# Patient Record
Sex: FEMALE | Race: WHITE | URBAN - METROPOLITAN AREA
[De-identification: names, ages, dates, MRNs, and addresses within clinical notes are randomized per-mention and may not be internally consistent; named-entity substitution may affect disease eponyms.]

---

## 2019-04-11 ENCOUNTER — APPOINTMENT (RX ONLY)
Dept: URBAN - METROPOLITAN AREA CLINIC 27 | Facility: CLINIC | Age: 74
Setting detail: DERMATOLOGY
End: 2019-04-11

## 2019-04-11 DIAGNOSIS — L81.6 OTHER DISORDERS OF DIMINISHED MELANIN FORMATION: ICD-10-CM

## 2019-04-11 DIAGNOSIS — L57.0 ACTINIC KERATOSIS: ICD-10-CM

## 2019-04-11 DIAGNOSIS — L81.4 OTHER MELANIN HYPERPIGMENTATION: ICD-10-CM

## 2019-04-11 DIAGNOSIS — Z41.9 ENCOUNTER FOR PROCEDURE FOR PURPOSES OTHER THAN REMEDYING HEALTH STATE, UNSPECIFIED: ICD-10-CM

## 2019-04-11 DIAGNOSIS — D22 MELANOCYTIC NEVI: ICD-10-CM

## 2019-04-11 DIAGNOSIS — L30.9 DERMATITIS, UNSPECIFIED: ICD-10-CM

## 2019-04-11 DIAGNOSIS — L82.1 OTHER SEBORRHEIC KERATOSIS: ICD-10-CM

## 2019-04-11 DIAGNOSIS — L71.8 OTHER ROSACEA: ICD-10-CM

## 2019-04-11 DIAGNOSIS — D18.0 HEMANGIOMA: ICD-10-CM

## 2019-04-11 DIAGNOSIS — Z71.89 OTHER SPECIFIED COUNSELING: ICD-10-CM

## 2019-04-11 PROBLEM — D18.01 HEMANGIOMA OF SKIN AND SUBCUTANEOUS TISSUE: Status: ACTIVE | Noted: 2019-04-11

## 2019-04-11 PROBLEM — D22.5 MELANOCYTIC NEVI OF TRUNK: Status: ACTIVE | Noted: 2019-04-11

## 2019-04-11 PROBLEM — D22.39 MELANOCYTIC NEVI OF OTHER PARTS OF FACE: Status: ACTIVE | Noted: 2019-04-11

## 2019-04-11 PROBLEM — D22.61 MELANOCYTIC NEVI OF RIGHT UPPER LIMB, INCLUDING SHOULDER: Status: ACTIVE | Noted: 2019-04-11

## 2019-04-11 PROBLEM — D22.62 MELANOCYTIC NEVI OF LEFT UPPER LIMB, INCLUDING SHOULDER: Status: ACTIVE | Noted: 2019-04-11

## 2019-04-11 PROBLEM — D22.72 MELANOCYTIC NEVI OF LEFT LOWER LIMB, INCLUDING HIP: Status: ACTIVE | Noted: 2019-04-11

## 2019-04-11 PROBLEM — D22.71 MELANOCYTIC NEVI OF RIGHT LOWER LIMB, INCLUDING HIP: Status: ACTIVE | Noted: 2019-04-11

## 2019-04-11 PROCEDURE — ? MEDICAL CONSULTATION: FILLERS

## 2019-04-11 PROCEDURE — ? PRESCRIPTION

## 2019-04-11 PROCEDURE — ? COUNSELING

## 2019-04-11 PROCEDURE — 17003 DESTRUCT PREMALG LES 2-14: CPT

## 2019-04-11 PROCEDURE — ? OTHER

## 2019-04-11 PROCEDURE — 99203 OFFICE O/P NEW LOW 30 MIN: CPT | Mod: 25

## 2019-04-11 PROCEDURE — 17000 DESTRUCT PREMALG LESION: CPT

## 2019-04-11 PROCEDURE — ? LIQUID NITROGEN

## 2019-04-11 RX ORDER — AMMONIUM LACTATE 12 %
LOTION (GRAM) TOPICAL QD
Qty: 1 | Refills: 5 | Status: ERX | COMMUNITY
Start: 2019-04-11

## 2019-04-11 RX ORDER — TRIAMCINOLONE ACETONIDE 1 MG/G
CREAM TOPICAL BID
Qty: 1 | Refills: 0 | Status: ERX | COMMUNITY
Start: 2019-04-11

## 2019-04-11 RX ADMIN — Medication 1: at 00:00

## 2019-04-11 RX ADMIN — TRIAMCINOLONE ACETONIDE 1: 1 CREAM TOPICAL at 00:00

## 2019-04-11 ASSESSMENT — LOCATION DETAILED DESCRIPTION DERM
LOCATION DETAILED: LEFT UPPER CUTANEOUS LIP
LOCATION DETAILED: SUPERIOR LUMBAR SPINE
LOCATION DETAILED: EPIGASTRIC SKIN
LOCATION DETAILED: RIGHT SUPERIOR VERMILION LIP
LOCATION DETAILED: NASAL DORSUM
LOCATION DETAILED: RIGHT MEDIAL BREAST 5-6:00 REGION
LOCATION DETAILED: UPPER STERNUM
LOCATION DETAILED: MIDDLE STERNUM
LOCATION DETAILED: LEFT INFERIOR NASAL CHEEK
LOCATION DETAILED: LEFT INFERIOR MEDIAL MALAR CHEEK
LOCATION DETAILED: RIGHT SUPERIOR MEDIAL UPPER BACK
LOCATION DETAILED: LEFT RIB CAGE
LOCATION DETAILED: RIGHT CENTRAL MALAR CHEEK
LOCATION DETAILED: RIGHT ANTERIOR PROXIMAL THIGH
LOCATION DETAILED: RIGHT PROXIMAL PRETIBIAL REGION
LOCATION DETAILED: LEFT PROXIMAL DORSAL FOREARM
LOCATION DETAILED: LEFT INFERIOR CENTRAL MALAR CHEEK
LOCATION DETAILED: RIGHT DISTAL PRETIBIAL REGION
LOCATION DETAILED: RIGHT MEDIAL UPPER BACK
LOCATION DETAILED: LEFT ANTERIOR DISTAL UPPER ARM
LOCATION DETAILED: LEFT PROXIMAL PRETIBIAL REGION
LOCATION DETAILED: LEFT MEDIAL MALAR CHEEK
LOCATION DETAILED: RIGHT MEDIAL SUPERIOR CHEST
LOCATION DETAILED: LEFT INFERIOR VERMILION LIP
LOCATION DETAILED: RIGHT PROXIMAL DORSAL FOREARM
LOCATION DETAILED: LEFT CENTRAL MALAR CHEEK
LOCATION DETAILED: PERIUMBILICAL SKIN
LOCATION DETAILED: LEFT SUPERIOR MEDIAL BUCCAL CHEEK
LOCATION DETAILED: RIGHT DISTAL POSTERIOR UPPER ARM
LOCATION DETAILED: RIGHT ANTERIOR DISTAL UPPER ARM
LOCATION DETAILED: SUPERIOR THORACIC SPINE
LOCATION DETAILED: LEFT DISTAL PRETIBIAL REGION
LOCATION DETAILED: RIGHT PROXIMAL RADIAL DORSAL FOREARM

## 2019-04-11 ASSESSMENT — LOCATION SIMPLE DESCRIPTION DERM
LOCATION SIMPLE: RIGHT THIGH
LOCATION SIMPLE: RIGHT UPPER ARM
LOCATION SIMPLE: LOWER BACK
LOCATION SIMPLE: LEFT UPPER ARM
LOCATION SIMPLE: LEFT LIP
LOCATION SIMPLE: RIGHT POSTERIOR UPPER ARM
LOCATION SIMPLE: RIGHT PRETIBIAL REGION
LOCATION SIMPLE: RIGHT LIP
LOCATION SIMPLE: ABDOMEN
LOCATION SIMPLE: CHEST
LOCATION SIMPLE: RIGHT BREAST
LOCATION SIMPLE: RIGHT CHEEK
LOCATION SIMPLE: RIGHT FOREARM
LOCATION SIMPLE: LEFT CHEEK
LOCATION SIMPLE: NOSE
LOCATION SIMPLE: LEFT PRETIBIAL REGION
LOCATION SIMPLE: LEFT FOREARM
LOCATION SIMPLE: UPPER BACK
LOCATION SIMPLE: RIGHT UPPER BACK

## 2019-04-11 ASSESSMENT — LOCATION ZONE DERM
LOCATION ZONE: NOSE
LOCATION ZONE: LIP
LOCATION ZONE: ARM
LOCATION ZONE: LEG
LOCATION ZONE: TRUNK
LOCATION ZONE: FACE

## 2019-04-11 NOTE — PROCEDURE: OTHER
Note Text (......Xxx Chief Complaint.): This diagnosis correlates with the
Other (Free Text): Pt to start TAC on chest and back as directed\\nPt to start ammonium lactate daily to chest and back\\n\\nIf lesions do not resolve, consider possibility of actinic damage\\n-pt to return if lesions do not resolve
Detail Level: Zone
Other (Free Text): Pt interested in fillers perioral\\nPt stated having had good results with fillers in the past

## 2019-06-18 ENCOUNTER — APPOINTMENT (RX ONLY)
Dept: URBAN - METROPOLITAN AREA CLINIC 27 | Facility: CLINIC | Age: 74
Setting detail: DERMATOLOGY
End: 2019-06-18

## 2019-06-18 DIAGNOSIS — Z41.9 ENCOUNTER FOR PROCEDURE FOR PURPOSES OTHER THAN REMEDYING HEALTH STATE, UNSPECIFIED: ICD-10-CM

## 2019-06-18 PROCEDURE — ? RESTYLANE REFYNE INJECTION

## 2019-06-18 NOTE — PROCEDURE: RESTYLANE REFYNE INJECTION
Topical Anesthesia?: BLT cream (benzocaine 20%, lidocaine 10%, tetracaine 10%)
Decollete Filler Volume In Cc: 0
Expiration Date (Month Year): 5/31/2020
Use Map Statement For Sites (Optional): No
Map Statement: See Attach Map for Complete Details
Consent: Written consent obtained. Risks include but not limited to bruising, beading, irregular texture, ulceration, infection, allergic reaction, scar formation, incomplete augmentation, temporary nature, procedural pain.
Detail Level: Detailed
Lot #: [id number]
Post-Care Instructions: Patient instructed to apply ice to reduce swelling.
Filler: Restylane Refyne
Additional Area 1 Location: Perioral
Procedural Text: The filler was administered to the treatment areas noted above.
Price (Use Numbers Only, No Special Characters Or $): 700.00
Marionette Lines Filler Volume In Cc: 0.2
Additional Area 1 Volume In Cc: 0.4

## 2019-07-02 ENCOUNTER — APPOINTMENT (RX ONLY)
Dept: URBAN - METROPOLITAN AREA CLINIC 27 | Facility: CLINIC | Age: 74
Setting detail: DERMATOLOGY
End: 2019-07-02

## 2019-07-02 DIAGNOSIS — Z41.9 ENCOUNTER FOR PROCEDURE FOR PURPOSES OTHER THAN REMEDYING HEALTH STATE, UNSPECIFIED: ICD-10-CM

## 2019-07-02 PROCEDURE — ? RESTYLANE REFYNE INJECTION

## 2019-07-02 PROCEDURE — ? OTHER

## 2019-07-02 NOTE — PROCEDURE: OTHER
Other (Free Text): -No charge; pt used the remainder of her filler from her LV on 6/18/19
Note Text (......Xxx Chief Complaint.): This diagnosis correlates with the
Detail Level: Zone

## 2019-07-02 NOTE — PROCEDURE: RESTYLANE REFYNE INJECTION
Use Map Statement For Sites (Optional): No
Expiration Date (Month Year): 5/31/2019
Additional Area 4 Volume In Cc: 0
Topical Anesthesia?: BLT cream (benzocaine 20%, lidocaine 10%, tetracaine 10%)
Anesthesia Volume In Cc: 0.5
Detail Level: Detailed
Procedural Text: The filler was administered to the treatment areas noted above.
Additional Area 1 Location: Perioral
Filler: Restylane Refyne
Map Statement: See Attach Map for Complete Details
Consent: Written consent obtained. Risks include but not limited to bruising, beading, irregular texture, ulceration, infection, allergic reaction, scar formation, incomplete augmentation, temporary nature, procedural pain.
Lot #: [id number]
Post-Care Instructions: Patient instructed to apply ice to reduce swelling.
Additional Area 1 Volume In Cc: 0.4
Additional Anesthesia Volume In Cc: 6
Anesthesia Type: 1% lidocaine with epinephrine

## 2019-07-25 ENCOUNTER — APPOINTMENT (RX ONLY)
Dept: URBAN - METROPOLITAN AREA CLINIC 27 | Facility: CLINIC | Age: 74
Setting detail: DERMATOLOGY
End: 2019-07-25

## 2019-07-25 DIAGNOSIS — Z41.9 ENCOUNTER FOR PROCEDURE FOR PURPOSES OTHER THAN REMEDYING HEALTH STATE, UNSPECIFIED: ICD-10-CM

## 2019-07-25 PROCEDURE — ? RESTYLANE LYFT INJECTION

## 2019-07-25 ASSESSMENT — LOCATION DETAILED DESCRIPTION DERM
LOCATION DETAILED: LEFT SUPERIOR LATERAL MALAR CHEEK
LOCATION DETAILED: RIGHT SUPERIOR LATERAL MALAR CHEEK
LOCATION DETAILED: RIGHT CENTRAL MALAR CHEEK
LOCATION DETAILED: LEFT SUPERIOR CENTRAL MALAR CHEEK
LOCATION DETAILED: LEFT SUPERIOR MEDIAL MALAR CHEEK

## 2019-07-25 ASSESSMENT — LOCATION SIMPLE DESCRIPTION DERM
LOCATION SIMPLE: RIGHT CHEEK
LOCATION SIMPLE: LEFT CHEEK

## 2019-07-25 ASSESSMENT — LOCATION ZONE DERM: LOCATION ZONE: FACE

## 2019-07-25 NOTE — PROCEDURE: RESTYLANE LYFT INJECTION
Procedural Text: The filler was administered to the treatment areas noted above.
Consent: Written consent obtained. Risks include but not limited to bruising, beading, irregular texture, ulceration, infection, allergic reaction, scar formation, incomplete augmentation, temporary nature, procedural pain.
Additional Area 5 Volume In Cc: 0
Filler: Sarah Zacarias
Lot #: [id number]
Post-Care Instructions: Patient instructed to apply ice to reduce swelling.
Include Cannula Information In Note?: No
Price (Use Numbers Only, No Special Characters Or $): 223
Additional Anesthesia Volume In Cc: 6
Cheeks Filler Volume In Cc: 1
Expiration Date (Month Year): 11/30/2021
Topical Anesthesia?: BLT cream (benzocaine 20%, lidocaine 6%, tetracaine 4%)
Anesthesia Volume In Cc: 0.5
Detail Level: Detailed
Map Statement: See Attach Map for Complete Details

## 2020-01-10 ENCOUNTER — APPOINTMENT (RX ONLY)
Dept: URBAN - METROPOLITAN AREA CLINIC 27 | Facility: CLINIC | Age: 75
Setting detail: DERMATOLOGY
End: 2020-01-10

## 2020-01-10 DIAGNOSIS — Z41.9 ENCOUNTER FOR PROCEDURE FOR PURPOSES OTHER THAN REMEDYING HEALTH STATE, UNSPECIFIED: ICD-10-CM

## 2020-01-10 PROCEDURE — ? OTHER

## 2020-01-10 PROCEDURE — ? RESTYLANE REFYNE INJECTION

## 2020-01-10 PROCEDURE — ? RESTYLANE LYFT INJECTION

## 2020-01-10 NOTE — PROCEDURE: OTHER
Detail Level: Zone
Note Text (......Xxx Chief Complaint.): This diagnosis correlates with the
Other (Free Text): - Saved remaining of the 0.4 cc of Defyne for lip touch up\\n- Upcoming wedding in 2 weeks\\n- VS special- purchased 2

## 2020-01-10 NOTE — PROCEDURE: RESTYLANE LYFT INJECTION
Use Map Statement For Sites (Optional): No
Number Of Syringes (Required For Inventory): 1
Additional Area 1 Volume In Cc: 0
Additional Anesthesia Volume In Cc: 6
Reconstitution Date: 1-
Post-Care Instructions: Patient instructed to apply ice to reduce swelling.
Anesthesia Volume In Cc: 0.5
Detail Level: Detailed
Expiration Date (Month Year): 4-
Map Statement: See Attach Map for Complete Details
Filler: Sarah Zacarias
Procedural Text: The filler was administered to the treatment areas noted above.
Consent: Written consent obtained. Risks include but not limited to bruising, beading, irregular texture, ulceration, infection, allergic reaction, scar formation, incomplete augmentation, temporary nature, procedural pain.
Lot #: [id number]
Anesthesia Type: 1% lidocaine with epinephrine

## 2020-01-10 NOTE — PROCEDURE: RESTYLANE REFYNE INJECTION
Brows Filler Volume In Cc: 0
Number Of Syringes (Required For Inventory): 1
Additional Area 1 Volume In Cc: 0.6
Use Map Statement For Sites (Optional): No
Reconstitution Date: 1-
Detail Level: Detailed
Map Statement: See Attach Map for Complete Details
Expiration Date (Month Year): 5-1-2020
Anesthesia Volume In Cc: 0.5
Filler: Restylane Refyne
Consent: Written consent obtained. Risks include but not limited to bruising, beading, irregular texture, ulceration, infection, allergic reaction, scar formation, incomplete augmentation, temporary nature, procedural pain.
Post-Care Instructions: Patient instructed to apply ice to reduce swelling.
Additional Anesthesia Volume In Cc: 6
Procedural Text: The filler was administered to the treatment areas noted above.
Additional Area 1 Location: perioral
Anesthesia Type: 1% lidocaine with epinephrine
Lot #: [id number]

## 2020-01-30 ENCOUNTER — APPOINTMENT (RX ONLY)
Dept: URBAN - METROPOLITAN AREA CLINIC 27 | Facility: CLINIC | Age: 75
Setting detail: DERMATOLOGY
End: 2020-01-30

## 2020-01-30 DIAGNOSIS — Z41.9 ENCOUNTER FOR PROCEDURE FOR PURPOSES OTHER THAN REMEDYING HEALTH STATE, UNSPECIFIED: ICD-10-CM

## 2020-01-30 PROCEDURE — ? FACIAL

## 2020-01-30 ASSESSMENT — LOCATION SIMPLE DESCRIPTION DERM: LOCATION SIMPLE: LEFT CHEEK

## 2020-01-30 ASSESSMENT — LOCATION ZONE DERM: LOCATION ZONE: FACE

## 2020-01-30 ASSESSMENT — LOCATION DETAILED DESCRIPTION DERM: LOCATION DETAILED: LEFT INFERIOR CENTRAL MALAR CHEEK

## 2020-01-30 NOTE — PROCEDURE: FACIAL
Detail Level: Zone
Facial Steaming: steamed
Treatment Type (Optional): Spa Facial
Mask Type (Optional): calming
Comments (Sticky): *comp for being so patient waiting for filler.\\n\\nGentle cleanser- steam/hot towel, papaya enzyme-steam/hot towel, cucumber toner, 12 flower mask + ormedic mask, warm towel removal, ce ferulic, HA Intensifier, triple lipid, fusion SPF 50, ormedic lip.
Exfoliation Type: enzyme

## 2020-02-07 ENCOUNTER — APPOINTMENT (RX ONLY)
Dept: URBAN - METROPOLITAN AREA CLINIC 27 | Facility: CLINIC | Age: 75
Setting detail: DERMATOLOGY
End: 2020-02-07

## 2020-02-07 DIAGNOSIS — Z41.9 ENCOUNTER FOR PROCEDURE FOR PURPOSES OTHER THAN REMEDYING HEALTH STATE, UNSPECIFIED: ICD-10-CM

## 2020-02-07 PROCEDURE — ? COSMETIC FOLLOW-UP

## 2020-02-07 PROCEDURE — ? PRESCRIPTION

## 2020-02-07 PROCEDURE — ? RESTYLANE REFYNE INJECTION

## 2020-02-07 ASSESSMENT — LOCATION ZONE DERM: LOCATION ZONE: FACE

## 2020-02-07 ASSESSMENT — LOCATION SIMPLE DESCRIPTION DERM
LOCATION SIMPLE: LEFT CHEEK
LOCATION SIMPLE: RIGHT CHEEK

## 2020-02-07 ASSESSMENT — LOCATION DETAILED DESCRIPTION DERM
LOCATION DETAILED: LEFT SUPERIOR MEDIAL BUCCAL CHEEK
LOCATION DETAILED: RIGHT SUPERIOR MEDIAL BUCCAL CHEEK
LOCATION DETAILED: LEFT CENTRAL MALAR CHEEK
LOCATION DETAILED: RIGHT CENTRAL MALAR CHEEK

## 2020-02-07 NOTE — PROCEDURE: RESTYLANE REFYNE INJECTION
Lateral Face Filler Volume In Cc: 0
Reconstitution Date: 1-
Additional Anesthesia Volume In Cc: 6
Map Statement: See Attach Map for Complete Details
Anesthesia Type: 1% lidocaine with epinephrine
Lot #: [id number]
Additional Area 1 Location: Lips
Additional Area 1 Volume In Cc: 0.4
Include Cannula Information In Note?: No
Consent: Written consent obtained. Risks include but not limited to bruising, beading, irregular texture, ulceration, infection, allergic reaction, scar formation, incomplete augmentation, temporary nature, procedural pain.
Expiration Date (Month Year): 5-1-2020
Anesthesia Volume In Cc: 0.5
Detail Level: Detailed
Post-Care Instructions: Patient instructed to apply ice to reduce swelling.
Filler: Restylane Refyne
Procedural Text: The filler was administered to the treatment areas noted above.
Number Of Syringes (Required For Inventory): 1

## 2020-02-07 NOTE — PROCEDURE: COSMETIC FOLLOW-UP
Detail Level: Zone
Patient Satisfaction: Very pleased
Side Effects Or Complications: None
Treatment (Optional): Filler Injection
Global Improvement: Very Good

## 2020-09-10 ENCOUNTER — APPOINTMENT (RX ONLY)
Dept: URBAN - METROPOLITAN AREA CLINIC 27 | Facility: CLINIC | Age: 75
Setting detail: DERMATOLOGY
End: 2020-09-10

## 2020-09-10 DIAGNOSIS — D22 MELANOCYTIC NEVI: ICD-10-CM

## 2020-09-10 DIAGNOSIS — D18.0 HEMANGIOMA: ICD-10-CM

## 2020-09-10 DIAGNOSIS — L82.0 INFLAMED SEBORRHEIC KERATOSIS: ICD-10-CM

## 2020-09-10 DIAGNOSIS — L82.1 OTHER SEBORRHEIC KERATOSIS: ICD-10-CM

## 2020-09-10 PROBLEM — D22.72 MELANOCYTIC NEVI OF LEFT LOWER LIMB, INCLUDING HIP: Status: ACTIVE | Noted: 2020-09-10

## 2020-09-10 PROBLEM — D22.5 MELANOCYTIC NEVI OF TRUNK: Status: ACTIVE | Noted: 2020-09-10

## 2020-09-10 PROBLEM — D22.39 MELANOCYTIC NEVI OF OTHER PARTS OF FACE: Status: ACTIVE | Noted: 2020-09-10

## 2020-09-10 PROBLEM — D18.01 HEMANGIOMA OF SKIN AND SUBCUTANEOUS TISSUE: Status: ACTIVE | Noted: 2020-09-10

## 2020-09-10 PROBLEM — D22.61 MELANOCYTIC NEVI OF RIGHT UPPER LIMB, INCLUDING SHOULDER: Status: ACTIVE | Noted: 2020-09-10

## 2020-09-10 PROBLEM — D22.71 MELANOCYTIC NEVI OF RIGHT LOWER LIMB, INCLUDING HIP: Status: ACTIVE | Noted: 2020-09-10

## 2020-09-10 PROBLEM — D22.62 MELANOCYTIC NEVI OF LEFT UPPER LIMB, INCLUDING SHOULDER: Status: ACTIVE | Noted: 2020-09-10

## 2020-09-10 PROCEDURE — 99214 OFFICE O/P EST MOD 30 MIN: CPT | Mod: 25

## 2020-09-10 PROCEDURE — 17110 DESTRUCTION B9 LES UP TO 14: CPT

## 2020-09-10 PROCEDURE — ? COUNSELING

## 2020-09-10 PROCEDURE — ? LIQUID NITROGEN

## 2020-09-10 ASSESSMENT — LOCATION ZONE DERM
LOCATION ZONE: LEG
LOCATION ZONE: TRUNK
LOCATION ZONE: ARM
LOCATION ZONE: SCALP
LOCATION ZONE: FACE

## 2020-09-10 ASSESSMENT — LOCATION DETAILED DESCRIPTION DERM
LOCATION DETAILED: RIGHT DISTAL PRETIBIAL REGION
LOCATION DETAILED: RIGHT CENTRAL MALAR CHEEK
LOCATION DETAILED: SUPERIOR THORACIC SPINE
LOCATION DETAILED: UPPER STERNUM
LOCATION DETAILED: RIGHT MEDIAL UPPER BACK
LOCATION DETAILED: LEFT DISTAL PRETIBIAL REGION
LOCATION DETAILED: RIGHT PROXIMAL RADIAL DORSAL FOREARM
LOCATION DETAILED: SUPERIOR LUMBAR SPINE
LOCATION DETAILED: RIGHT PROXIMAL PRETIBIAL REGION
LOCATION DETAILED: RIGHT ANTERIOR DISTAL UPPER ARM
LOCATION DETAILED: RIGHT INFERIOR MEDIAL MALAR CHEEK
LOCATION DETAILED: LEFT PROXIMAL PRETIBIAL REGION
LOCATION DETAILED: RIGHT VENTRAL PROXIMAL FOREARM
LOCATION DETAILED: PERIUMBILICAL SKIN
LOCATION DETAILED: EPIGASTRIC SKIN
LOCATION DETAILED: LEFT VENTRAL LATERAL PROXIMAL FOREARM
LOCATION DETAILED: LEFT INFERIOR CENTRAL MALAR CHEEK
LOCATION DETAILED: LEFT CENTRAL MALAR CHEEK
LOCATION DETAILED: LEFT ANTERIOR DISTAL UPPER ARM
LOCATION DETAILED: LEFT PROXIMAL DORSAL FOREARM
LOCATION DETAILED: LEFT INFERIOR CENTRAL BUCCAL CHEEK
LOCATION DETAILED: LEFT MEDIAL FRONTAL SCALP

## 2020-09-10 ASSESSMENT — LOCATION SIMPLE DESCRIPTION DERM
LOCATION SIMPLE: RIGHT CHEEK
LOCATION SIMPLE: ABDOMEN
LOCATION SIMPLE: RIGHT UPPER ARM
LOCATION SIMPLE: LEFT UPPER ARM
LOCATION SIMPLE: RIGHT FOREARM
LOCATION SIMPLE: LEFT PRETIBIAL REGION
LOCATION SIMPLE: LEFT CHEEK
LOCATION SIMPLE: LEFT SCALP
LOCATION SIMPLE: UPPER BACK
LOCATION SIMPLE: CHEST
LOCATION SIMPLE: LOWER BACK
LOCATION SIMPLE: RIGHT UPPER BACK
LOCATION SIMPLE: RIGHT PRETIBIAL REGION
LOCATION SIMPLE: LEFT FOREARM

## 2020-09-10 NOTE — PROCEDURE: LIQUID NITROGEN
Medical Necessity Clause: This procedure was medically necessary because the lesions that were treated were:
Number Of Freeze-Thaw Cycles: 2 freeze-thaw cycles
Post-Care Instructions: I reviewed with the patient in detail post-care instructions. Patient is to wear sunprotection, and avoid picking at any of the treated lesions. Pt may apply Vaseline to crusted or scabbing areas.
Render Post-Care Instructions In Note?: no
Consent: The patient's consent was obtained including but not limited to risks of crusting, scabbing, blistering, scarring, darker or lighter pigmentary change, recurrence, incomplete removal and infection.
Medical Necessity Information: It is in your best interest to select a reason for this procedure from the list below. All of these items fulfill various CMS LCD requirements except the new and changing color options.
Detail Level: Simple

## 2020-09-25 ENCOUNTER — APPOINTMENT (RX ONLY)
Dept: URBAN - METROPOLITAN AREA TELEMEDICINE 2 | Facility: TELEMEDICINE | Age: 75
Setting detail: DERMATOLOGY
End: 2020-09-25

## 2020-09-25 DIAGNOSIS — Z41.9 ENCOUNTER FOR PROCEDURE FOR PURPOSES OTHER THAN REMEDYING HEALTH STATE, UNSPECIFIED: ICD-10-CM

## 2020-09-25 PROCEDURE — ? COSMETIC CONSULTATION: FILLERS

## 2020-09-25 PROCEDURE — ? OTHER

## 2020-09-25 PROCEDURE — ? FILLERS

## 2020-09-25 NOTE — PROCEDURE: FILLERS
Filler: Juvederm Volbella XC
Additional Area 3 Volume In Cc: 0
Filler: Sarah Zacarias
Price (Use Numbers Only, No Special Characters Or $): 8322
Nasolabial Folds Filler Volume In Cc: 0.5
Use Map Statement For Sites (Optional): No
Vermilion Lips Filler Volume In Cc: 0.1
Map Statment: See Attach Map for Complete Details
Cheeks Filler Volume In Cc: 1
Lot #: [id number]
Lot #: [id number]
Marionette Lines Filler Volume In Cc: 0.4
Consent: Written consent obtained. Risks include but not limited to bruising, beading, irregular texture, ulceration, infection, allergic reaction, scar formation, incomplete augmentation, temporary nature, procedural pain.
Lot #: [id number]
Reconstitution Date: 9/25/20
Post-Care Instructions: Patient instructed to apply ice to reduce swelling.
Anesthesia Type: 1% lidocaine with epinephrine
Expiration Date (Month Year): 6/28/2021
Expiration Date (Month Year): 11/30/2022
Expiration Date (Month Year): 8/27/2021
Additional Anesthesia Volume In Cc: 6
Topical Anesthesia?: BLT gel (benzocaine 20%, lidocaine 10%, tetracaine 6%)
Filler: Juvederm Ultra Plus XC
Detail Level: Detailed

## 2021-03-04 ENCOUNTER — APPOINTMENT (RX ONLY)
Dept: URBAN - METROPOLITAN AREA TELEMEDICINE 2 | Facility: TELEMEDICINE | Age: 76
Setting detail: DERMATOLOGY
End: 2021-03-04

## 2021-03-04 DIAGNOSIS — L57.0 ACTINIC KERATOSIS: ICD-10-CM

## 2021-03-04 DIAGNOSIS — Z41.9 ENCOUNTER FOR PROCEDURE FOR PURPOSES OTHER THAN REMEDYING HEALTH STATE, UNSPECIFIED: ICD-10-CM

## 2021-03-04 PROCEDURE — 17000 DESTRUCT PREMALG LESION: CPT

## 2021-03-04 PROCEDURE — ? PATIENT SPECIFIC COUNSELING

## 2021-03-04 PROCEDURE — ? LIQUID NITROGEN

## 2021-03-04 PROCEDURE — ? FILLERS

## 2021-03-04 PROCEDURE — ? COSMETIC CONSULTATION: FILLERS

## 2021-03-04 PROCEDURE — ? OTHER (COSMETIC)

## 2021-03-04 PROCEDURE — ? COUNSELING

## 2021-03-04 ASSESSMENT — LOCATION ZONE DERM: LOCATION ZONE: FACE

## 2021-03-04 ASSESSMENT — LOCATION SIMPLE DESCRIPTION DERM: LOCATION SIMPLE: LEFT FOREHEAD

## 2021-03-04 ASSESSMENT — LOCATION DETAILED DESCRIPTION DERM: LOCATION DETAILED: LEFT FOREHEAD

## 2021-03-04 NOTE — PROCEDURE: LIQUID NITROGEN
Number Of Freeze-Thaw Cycles: 3 freeze-thaw cycles
Duration Of Freeze Thaw-Cycle (Seconds): 5
Render Note In Bullet Format When Appropriate: No
Detail Level: Zone
Render Post-Care Instructions In Note?: yes
Post-Care Instructions: I reviewed with the patient in detail post-care instructions. Patient is to wear sunprotection, and avoid picking at any of the treated lesions. Pt may apply Vaseline to crusted or scabbing areas.
Consent: The patient's consent was obtained including but not limited to risks of crusting, scabbing, blistering, scarring, darker or lighter pigmentary change, recurrence, incomplete removal and infection.

## 2021-03-04 NOTE — PROCEDURE: FILLERS
Additional Area 3 Volume In Cc: 0
Detail Level: Detailed
Cheeks Filler Volume In Cc: 0.6
Filler: Juvederm Ultra Plus XC
Jawline Filler Volume In Cc: 0.4
Filler: Juvederm Volbella XC
Price (Use Numbers Only, No Special Characters Or $): 6015
Use Map Statement For Sites (Optional): No
Lot #: [id number]
Reconstitution Date: 03/04/2021
Map Statment: See Attach Map for Complete Details
Nasolabial Folds Filler Volume In Cc: 0.7
Expiration Date (Month Year): 07/31/2023
Consent: Written consent obtained. Risks include but not limited to bruising, beading, irregular texture, ulceration, infection, allergic reaction, scar formation, incomplete augmentation, temporary nature, procedural pain.
Lot #: [id number]
Post-Care Instructions: Patient instructed to apply ice to reduce swelling.
Marionette Lines Filler Volume In Cc: 0.3
Vermilion Lips Filler Volume In Cc: 0.5
Lot #: [id number]
Expiration Date (Month Year): 08/02/2021
Anesthesia Type: 1% lidocaine with epinephrine
Expiration Date (Month Year): 08/07/2022
Additional Anesthesia Volume In Cc: 6
Topical Anesthesia?: BLT cream (benzocaine 20%, lidocaine 6%, tetracaine 4%)
Filler: Sarah Zacarias

## 2021-08-11 ENCOUNTER — APPOINTMENT (RX ONLY)
Dept: URBAN - METROPOLITAN AREA TELEMEDICINE 2 | Facility: TELEMEDICINE | Age: 76
Setting detail: DERMATOLOGY
End: 2021-08-11

## 2021-08-11 DIAGNOSIS — D22 MELANOCYTIC NEVI: ICD-10-CM

## 2021-08-11 DIAGNOSIS — L81.4 OTHER MELANIN HYPERPIGMENTATION: ICD-10-CM

## 2021-08-11 DIAGNOSIS — Z71.89 OTHER SPECIFIED COUNSELING: ICD-10-CM

## 2021-08-11 DIAGNOSIS — L30.9 DERMATITIS, UNSPECIFIED: ICD-10-CM

## 2021-08-11 DIAGNOSIS — L82.1 OTHER SEBORRHEIC KERATOSIS: ICD-10-CM

## 2021-08-11 DIAGNOSIS — L57.0 ACTINIC KERATOSIS: ICD-10-CM

## 2021-08-11 DIAGNOSIS — D18.0 HEMANGIOMA: ICD-10-CM

## 2021-08-11 PROBLEM — D22.71 MELANOCYTIC NEVI OF RIGHT LOWER LIMB, INCLUDING HIP: Status: ACTIVE | Noted: 2021-08-11

## 2021-08-11 PROBLEM — D22.5 MELANOCYTIC NEVI OF TRUNK: Status: ACTIVE | Noted: 2021-08-11

## 2021-08-11 PROBLEM — D22.39 MELANOCYTIC NEVI OF OTHER PARTS OF FACE: Status: ACTIVE | Noted: 2021-08-11

## 2021-08-11 PROBLEM — D22.62 MELANOCYTIC NEVI OF LEFT UPPER LIMB, INCLUDING SHOULDER: Status: ACTIVE | Noted: 2021-08-11

## 2021-08-11 PROBLEM — D22.61 MELANOCYTIC NEVI OF RIGHT UPPER LIMB, INCLUDING SHOULDER: Status: ACTIVE | Noted: 2021-08-11

## 2021-08-11 PROBLEM — D22.72 MELANOCYTIC NEVI OF LEFT LOWER LIMB, INCLUDING HIP: Status: ACTIVE | Noted: 2021-08-11

## 2021-08-11 PROBLEM — D18.01 HEMANGIOMA OF SKIN AND SUBCUTANEOUS TISSUE: Status: ACTIVE | Noted: 2021-08-11

## 2021-08-11 PROCEDURE — ? PHOTO-DOCUMENTATION

## 2021-08-11 PROCEDURE — 17000 DESTRUCT PREMALG LESION: CPT | Mod: 59

## 2021-08-11 PROCEDURE — ? TREATMENT REGIMEN

## 2021-08-11 PROCEDURE — ? PRESCRIPTION

## 2021-08-11 PROCEDURE — ? BIOPSY BY PUNCH METHOD

## 2021-08-11 PROCEDURE — 99213 OFFICE O/P EST LOW 20 MIN: CPT | Mod: 25

## 2021-08-11 PROCEDURE — ? COUNSELING

## 2021-08-11 PROCEDURE — 17003 DESTRUCT PREMALG LES 2-14: CPT

## 2021-08-11 PROCEDURE — ? LIQUID NITROGEN

## 2021-08-11 PROCEDURE — 11104 PUNCH BX SKIN SINGLE LESION: CPT

## 2021-08-11 RX ORDER — TRIAMCINOLONE ACETONIDE 1 MG/G
CREAM TOPICAL BID
Qty: 1 | Refills: 2 | Status: ERX | COMMUNITY
Start: 2021-08-11

## 2021-08-11 RX ADMIN — TRIAMCINOLONE ACETONIDE 1: 1 CREAM TOPICAL at 00:00

## 2021-08-11 ASSESSMENT — LOCATION SIMPLE DESCRIPTION DERM
LOCATION SIMPLE: LEFT THIGH
LOCATION SIMPLE: RIGHT THIGH
LOCATION SIMPLE: RIGHT UPPER ARM
LOCATION SIMPLE: LEFT FOREARM
LOCATION SIMPLE: CHEST
LOCATION SIMPLE: LEFT LOWER BACK
LOCATION SIMPLE: LEFT SHOULDER
LOCATION SIMPLE: LEFT UPPER ARM
LOCATION SIMPLE: LEFT PRETIBIAL REGION
LOCATION SIMPLE: RIGHT SHOULDER
LOCATION SIMPLE: LEFT CALF
LOCATION SIMPLE: LEFT FOREHEAD
LOCATION SIMPLE: RIGHT LOWER BACK
LOCATION SIMPLE: RIGHT BREAST
LOCATION SIMPLE: RIGHT PRETIBIAL REGION
LOCATION SIMPLE: RIGHT POSTERIOR THIGH
LOCATION SIMPLE: UPPER BACK
LOCATION SIMPLE: LOWER BACK
LOCATION SIMPLE: RIGHT CALF
LOCATION SIMPLE: ABDOMEN
LOCATION SIMPLE: LEFT UPPER BACK
LOCATION SIMPLE: RIGHT FOREARM
LOCATION SIMPLE: RIGHT UPPER BACK
LOCATION SIMPLE: LEFT POSTERIOR THIGH
LOCATION SIMPLE: RIGHT POSTERIOR UPPER ARM
LOCATION SIMPLE: LEFT CHEEK
LOCATION SIMPLE: ANTERIOR SCALP
LOCATION SIMPLE: LEFT POSTERIOR UPPER ARM

## 2021-08-11 ASSESSMENT — LOCATION DETAILED DESCRIPTION DERM
LOCATION DETAILED: LEFT PROXIMAL POSTERIOR UPPER ARM
LOCATION DETAILED: LEFT DISTAL POSTERIOR THIGH
LOCATION DETAILED: RIGHT DISTAL PRETIBIAL REGION
LOCATION DETAILED: LEFT POSTERIOR SHOULDER
LOCATION DETAILED: RIGHT DISTAL CALF
LOCATION DETAILED: RIGHT PROXIMAL POSTERIOR UPPER ARM
LOCATION DETAILED: RIGHT MEDIAL SUPERIOR CHEST
LOCATION DETAILED: RIGHT INFERIOR LATERAL MIDBACK
LOCATION DETAILED: RIGHT DISTAL POSTERIOR THIGH
LOCATION DETAILED: LEFT MID-UPPER BACK
LOCATION DETAILED: LEFT SUPERIOR LATERAL MIDBACK
LOCATION DETAILED: LEFT PROXIMAL PRETIBIAL REGION
LOCATION DETAILED: RIGHT DISTAL DORSAL FOREARM
LOCATION DETAILED: LEFT ANTERIOR DISTAL UPPER ARM
LOCATION DETAILED: MID-FRONTAL SCALP
LOCATION DETAILED: LEFT DISTAL PRETIBIAL REGION
LOCATION DETAILED: LEFT SUPERIOR MEDIAL BUCCAL CHEEK
LOCATION DETAILED: LEFT PROXIMAL CALF
LOCATION DETAILED: RIGHT ANTERIOR PROXIMAL THIGH
LOCATION DETAILED: LEFT PROXIMAL DORSAL FOREARM
LOCATION DETAILED: LEFT RIB CAGE
LOCATION DETAILED: MIDDLE STERNUM
LOCATION DETAILED: EPIGASTRIC SKIN
LOCATION DETAILED: PERIUMBILICAL SKIN
LOCATION DETAILED: RIGHT POSTERIOR SHOULDER
LOCATION DETAILED: SUPERIOR THORACIC SPINE
LOCATION DETAILED: SUPERIOR LUMBAR SPINE
LOCATION DETAILED: LEFT INFERIOR MEDIAL MALAR CHEEK
LOCATION DETAILED: RIGHT MEDIAL BREAST 5-6:00 REGION
LOCATION DETAILED: RIGHT MEDIAL UPPER BACK
LOCATION DETAILED: RIGHT PROXIMAL DORSAL FOREARM
LOCATION DETAILED: RIGHT SUPERIOR MEDIAL UPPER BACK
LOCATION DETAILED: UPPER STERNUM
LOCATION DETAILED: RIGHT PROXIMAL RADIAL DORSAL FOREARM
LOCATION DETAILED: RIGHT PROXIMAL CALF
LOCATION DETAILED: LEFT ANTERIOR DISTAL THIGH
LOCATION DETAILED: RIGHT PROXIMAL PRETIBIAL REGION
LOCATION DETAILED: LEFT DISTAL POSTERIOR UPPER ARM
LOCATION DETAILED: LEFT FOREHEAD
LOCATION DETAILED: LEFT SUPERIOR UPPER BACK
LOCATION DETAILED: RIGHT MEDIAL DISTAL PRETIBIAL REGION
LOCATION DETAILED: RIGHT ANTERIOR DISTAL THIGH
LOCATION DETAILED: RIGHT ANTERIOR DISTAL UPPER ARM

## 2021-08-11 ASSESSMENT — LOCATION ZONE DERM
LOCATION ZONE: SCALP
LOCATION ZONE: TRUNK
LOCATION ZONE: ARM
LOCATION ZONE: FACE
LOCATION ZONE: LEG

## 2021-08-11 ASSESSMENT — SEVERITY ASSESSMENT: SEVERITY: MILD TO MODERATE

## 2021-08-11 NOTE — PROCEDURE: BIOPSY BY PUNCH METHOD
Detail Level: Detailed
Was A Bandage Applied: Yes
Punch Size In Mm: 3
Biopsy Type: H and E
Anesthesia Type: 1% lidocaine with 1:100,000 epinephrine and a 1:10 solution of 8.4% sodium bicarbonate
Anesthesia Volume In Cc: 1
Additional Anesthesia Volume In Cc (Will Not Render If 0): 0
Hemostasis: Aluminum Chloride
Epidermal Sutures: gel foam
Wound Care: Vaseline
Dressing: telfa dressing
Patient Will Remove Sutures At Home?: No
Lab: [id number]
Lab Facility: [id number]
Consent: Written consent was obtained and risks were reviewed including but not limited to scarring, infection, bleeding, scabbing, incomplete removal, nerve damage and allergy to anesthesia.
Post-Care Instructions: I reviewed with the patient in detail post-care instructions. Patient is to keep the biopsy site dry overnight, and then apply bacitracin twice daily until healed. Patient may apply hydrogen peroxide soaks to remove any crusting.
Home Suture Removal Text: Patient was provided a home suture removal kit and will remove their sutures at home.  If they have any questions or difficulties they will call the office.
Notification Instructions: Patient will be notified of biopsy results. However, patient instructed to call the office if not contacted within 2 weeks.
Billing Type: Third-Party Bill
Information: Selecting Yes will display possible errors in your note based on the variables you have selected. This validation is only offered as a suggestion for you. PLEASE NOTE THAT THE VALIDATION TEXT WILL BE REMOVED WHEN YOU FINALIZE YOUR NOTE. IF YOU WANT TO FAX A PRELIMINARY NOTE YOU WILL NEED TO TOGGLE THIS TO 'NO' IF YOU DO NOT WANT IT IN YOUR FAXED NOTE.

## 2021-08-11 NOTE — PROCEDURE: TREATMENT REGIMEN
Otc Regimen: Oral Antihistamine- Zyrtec QAM\\nMoisturizing creams after showering such as Cerave, Cetaphil, or Eucerin
Initiate Treatment: Triamcinolone 0.1% cream- apply to affected areas BID x 2 weeks/ month PRN
Plan: Pt expressed understanding of all and agreed with tx plan.\\n\\nRTC PRN.
Detail Level: Zone

## 2021-08-11 NOTE — PROCEDURE: LIQUID NITROGEN
Consent: The patient's consent was obtained including but not limited to risks of crusting, scabbing, blistering, scarring, darker or lighter pigmentary change, recurrence, incomplete removal and infection.
Detail Level: Detailed
Post-Care Instructions: I reviewed with the patient in detail post-care instructions. Patient is to wear sunprotection, and avoid picking at any of the treated lesions. Pt may apply Vaseline to crusted or scabbing areas.
Duration Of Freeze Thaw-Cycle (Seconds): 10
Render Post-Care Instructions In Note?: yes
Number Of Freeze-Thaw Cycles: 1 freeze-thaw cycle
Application Tool (Optional): Liquid Nitrogen Sprayer

## 2021-08-19 ENCOUNTER — RX ONLY (OUTPATIENT)
Age: 76
Setting detail: RX ONLY
End: 2021-08-19

## 2021-08-19 RX ORDER — PREDNISONE 20 MG/1
TABLET ORAL QD
Qty: 5 | Refills: 1 | Status: CANCELLED

## 2021-08-19 RX ORDER — TRIAMCINOLONE ACETONIDE 1 MG/G
CREAM TOPICAL BID
Qty: 1 | Refills: 2 | Status: ERX

## 2021-08-27 ENCOUNTER — APPOINTMENT (RX ONLY)
Dept: URBAN - METROPOLITAN AREA TELEMEDICINE 2 | Facility: TELEMEDICINE | Age: 76
Setting detail: DERMATOLOGY
End: 2021-08-27

## 2021-08-27 DIAGNOSIS — Z41.9 ENCOUNTER FOR PROCEDURE FOR PURPOSES OTHER THAN REMEDYING HEALTH STATE, UNSPECIFIED: ICD-10-CM

## 2021-08-27 PROCEDURE — ? FILLERS

## 2021-08-27 PROCEDURE — ? COSMETIC CONSULTATION: FILLERS

## 2021-08-27 PROCEDURE — ? OTHER (COSMETIC)

## 2021-08-27 PROCEDURE — ? OTHER

## 2021-08-27 NOTE — PROCEDURE: OTHER
Note Text (......Xxx Chief Complaint.): This diagnosis correlates with the
Other (Free Text): Denies cold sore history
Detail Level: Detailed
Render Risk Assessment In Note?: no

## 2021-08-27 NOTE — PROCEDURE: FILLERS
Additional Area 3 Volume In Cc: 0
Detail Level: Detailed
Cheeks Filler Volume In Cc: 0.6
Filler: Juvederm Ultra Plus XC
Jawline Filler Volume In Cc: 0.4
Filler: Juvederm Volbella XC
Price (Use Numbers Only, No Special Characters Or $): 2239
Use Map Statement For Sites (Optional): No
Lot #: [id number]
Reconstitution Date: 08/27/2021
Map Statment: See Attach Map for Complete Details
Expiration Date (Month Year): 01/31/2024
Consent: Written consent obtained. Risks include but not limited to bruising, beading, irregular texture, ulceration, infection, allergic reaction, scar formation, incomplete augmentation, temporary nature, procedural pain.
Lot #: [id number]
Post-Care Instructions: Patient instructed to apply ice to reduce swelling.
Vermilion Lips Filler Volume In Cc: 0.5
Lot #: [id number]
Expiration Date (Month Year): 05/14/2022
Anesthesia Type: 1% lidocaine with epinephrine
Expiration Date (Month Year): 01/01/2023
Additional Anesthesia Volume In Cc: 6
Topical Anesthesia?: BLT cream (benzocaine 20%, lidocaine 6%, tetracaine 4%)
Filler: Sarah Zacarias

## 2022-01-31 ENCOUNTER — APPOINTMENT (RX ONLY)
Dept: URBAN - METROPOLITAN AREA TELEMEDICINE 2 | Facility: TELEMEDICINE | Age: 77
Setting detail: DERMATOLOGY
End: 2022-01-31

## 2022-01-31 DIAGNOSIS — Z41.9 ENCOUNTER FOR PROCEDURE FOR PURPOSES OTHER THAN REMEDYING HEALTH STATE, UNSPECIFIED: ICD-10-CM

## 2022-01-31 PROCEDURE — ? RECOMMENDATIONS

## 2022-01-31 PROCEDURE — ? COSMETIC CONSULTATION: FILLERS

## 2022-01-31 PROCEDURE — ? FILLERS

## 2022-01-31 PROCEDURE — ? OTHER

## 2022-01-31 PROCEDURE — ? COSMETIC CONSULTATION: BOTOX

## 2022-01-31 PROCEDURE — ? OTHER (COSMETIC)

## 2022-01-31 NOTE — PROCEDURE: FILLERS
Additional Area 3 Volume In Cc: 0
Additional Area 1 Volume In Cc: 0.3
Detail Level: Detailed
Cheeks Filler Volume In Cc: 0.4
Filler: Juvederm Ultra Plus XC
Jawline Filler Volume In Cc: 0.6
Filler: Juvederm Volbella XC
Price (Use Numbers Only, No Special Characters Or $): 2254
Use Map Statement For Sites (Optional): No
Lot #: [id number]
Reconstitution Date: 01/31/2022
Map Statment: See Attach Map for Complete Details
Expiration Date (Month Year): 04/2024
Consent: Written consent obtained. Risks include but not limited to bruising, beading, irregular texture, ulceration, infection, allergic reaction, scar formation, incomplete augmentation, temporary nature, procedural pain.
Lot #: [id number]
Post-Care Instructions: Patient instructed to apply ice to reduce swelling.
Marionette Lines Filler Volume In Cc: 1
Vermilion Lips Filler Volume In Cc: 0.2
Lot #: [id number]
Expiration Date (Month Year): 09/2022
Anesthesia Type: 1% lidocaine with epinephrine
Expiration Date (Month Year): 06/2023
Anesthesia Volume In Cc: 0.5
Additional Anesthesia Volume In Cc: 6
Topical Anesthesia?: BLT cream (benzocaine 20%, lidocaine 6%, tetracaine 4%)
Additional Area 1 Location: chin
Filler: Sarah Zacarias

## 2022-01-31 NOTE — PROCEDURE: RECOMMENDATIONS
Detail Level: Simple
Render Risk Assessment In Note?: no
Recommendations (Free Text): 6 units chin\\n4 units upper lip
Recommendation Preamble: The following recommendations were made during the visit:

## 2022-07-15 ENCOUNTER — APPOINTMENT (RX ONLY)
Dept: URBAN - METROPOLITAN AREA TELEMEDICINE 2 | Facility: TELEMEDICINE | Age: 77
Setting detail: DERMATOLOGY
End: 2022-07-15

## 2022-07-15 DIAGNOSIS — Z41.9 ENCOUNTER FOR PROCEDURE FOR PURPOSES OTHER THAN REMEDYING HEALTH STATE, UNSPECIFIED: ICD-10-CM

## 2022-07-15 PROCEDURE — ? OTHER

## 2022-07-15 PROCEDURE — ? COSMETIC CONSULTATION: BOTULINUM TOXIN

## 2022-07-15 PROCEDURE — ? BOTOX

## 2022-07-15 PROCEDURE — ? FILLERS

## 2022-07-15 PROCEDURE — ? COSMETIC CONSULTATION: FILLERS

## 2022-07-15 NOTE — PROCEDURE: BOTOX
Show Ucl Units: No
Nasal Root Units: 0
Show Additional Area 3: Yes
Expiration Date (Month Year): 03/31/2024
Price (Use Numbers Only, No Special Characters Or $): 78
Additional Area 1 Location: Brows
Consent: Written consent obtained. Risks include but not limited to lid/brow ptosis, bruising, swelling, diplopia, temporary effect, incomplete chemical denervation.
Additional Area 2 Location: chin
Post-Care Instructions: Patient instructed to not lie down for 4 hours and limit physical activity for 24 hours. Patient instructed not to travel by airplane for 48 hours.
Detail Level: Detailed
Additional Area 2 Units: 6
Lot #: [id number]
Dilution (U/0.1 Cc): 5
Reconstitution Date (Optional): 07/15/2022

## 2022-07-15 NOTE — PROCEDURE: OTHER
Detail Level: Detailed
Note Text (......Xxx Chief Complaint.): This diagnosis correlates with the
Render Risk Assessment In Note?: no
Other (Free Text): Denies cold sore history

## 2022-07-15 NOTE — PROCEDURE: FILLERS
Brows Filler Volume In Cc: 0
Include Cannula Information In Note?: No
Topical Anesthesia?: BLT cream (benzocaine 20%, lidocaine 10%, tetracaine 10%)
Additional Area 1 Location: perioral
Detail Level: Detailed
Additional Area 1 Volume In Cc: 0.8
Price (Use Numbers Only, No Special Characters Or $): 5495
Filler: Sarah Zacarias
Map Statment: See Attach Map for Complete Details
Nasolabial Folds Filler Volume In Cc: 0.5
Filler: Juvederm Volbella XC
Post-Care Instructions: Patient instructed to apply ice to reduce swelling.
Aspiration Statement: Aspiration was performed prior to injecting site with filler.
Consent: Written consent obtained. Risks include but not limited to bruising, beading, irregular texture, ulceration, infection, allergic reaction, scar formation, incomplete augmentation, temporary nature, procedural pain.
Lot #: [id number]
Vermilion Lips Filler Volume In Cc: 0.2
Expiration Date (Month Year): 09/30/2024
Reconstitution Date: 07/15/2022
Lot #: [id number]
Expiration Date (Month Year): 10/08/2023

## 2022-09-07 ENCOUNTER — APPOINTMENT (RX ONLY)
Dept: URBAN - METROPOLITAN AREA TELEMEDICINE 2 | Facility: TELEMEDICINE | Age: 77
Setting detail: DERMATOLOGY
End: 2022-09-07

## 2022-09-07 DIAGNOSIS — Z71.89 OTHER SPECIFIED COUNSELING: ICD-10-CM

## 2022-09-07 DIAGNOSIS — L82.1 OTHER SEBORRHEIC KERATOSIS: ICD-10-CM

## 2022-09-07 DIAGNOSIS — D18.0 HEMANGIOMA: ICD-10-CM

## 2022-09-07 DIAGNOSIS — L11.1 TRANSIENT ACANTHOLYTIC DERMATOSIS [GROVER]: ICD-10-CM

## 2022-09-07 DIAGNOSIS — L81.4 OTHER MELANIN HYPERPIGMENTATION: ICD-10-CM

## 2022-09-07 DIAGNOSIS — L57.0 ACTINIC KERATOSIS: ICD-10-CM

## 2022-09-07 DIAGNOSIS — D22 MELANOCYTIC NEVI: ICD-10-CM

## 2022-09-07 DIAGNOSIS — L71.8 OTHER ROSACEA: ICD-10-CM | Status: WORSENING

## 2022-09-07 PROBLEM — D18.01 HEMANGIOMA OF SKIN AND SUBCUTANEOUS TISSUE: Status: ACTIVE | Noted: 2022-09-07

## 2022-09-07 PROBLEM — D22.72 MELANOCYTIC NEVI OF LEFT LOWER LIMB, INCLUDING HIP: Status: ACTIVE | Noted: 2022-09-07

## 2022-09-07 PROBLEM — D22.39 MELANOCYTIC NEVI OF OTHER PARTS OF FACE: Status: ACTIVE | Noted: 2022-09-07

## 2022-09-07 PROBLEM — D22.61 MELANOCYTIC NEVI OF RIGHT UPPER LIMB, INCLUDING SHOULDER: Status: ACTIVE | Noted: 2022-09-07

## 2022-09-07 PROBLEM — D22.62 MELANOCYTIC NEVI OF LEFT UPPER LIMB, INCLUDING SHOULDER: Status: ACTIVE | Noted: 2022-09-07

## 2022-09-07 PROBLEM — D22.5 MELANOCYTIC NEVI OF TRUNK: Status: ACTIVE | Noted: 2022-09-07

## 2022-09-07 PROBLEM — D22.71 MELANOCYTIC NEVI OF RIGHT LOWER LIMB, INCLUDING HIP: Status: ACTIVE | Noted: 2022-09-07

## 2022-09-07 PROCEDURE — ? LIQUID NITROGEN

## 2022-09-07 PROCEDURE — ? PRESCRIPTION

## 2022-09-07 PROCEDURE — 17003 DESTRUCT PREMALG LES 2-14: CPT

## 2022-09-07 PROCEDURE — 17000 DESTRUCT PREMALG LESION: CPT

## 2022-09-07 PROCEDURE — ? PRESCRIPTION MEDICATION MANAGEMENT

## 2022-09-07 PROCEDURE — 99214 OFFICE O/P EST MOD 30 MIN: CPT | Mod: 25

## 2022-09-07 PROCEDURE — ? COUNSELING

## 2022-09-07 RX ORDER — METRONIDAZOLE 7.5 MG/G
CREAM TOPICAL BID
Qty: 45 | Refills: 3 | Status: ERX | COMMUNITY
Start: 2022-09-07

## 2022-09-07 RX ORDER — TRIAMCINOLONE ACETONIDE 1 MG/G
1 CREAM TOPICAL BID
Qty: 80 | Refills: 1 | Status: ERX

## 2022-09-07 RX ADMIN — METRONIDAZOLE THIN LAYER: 7.5 CREAM TOPICAL at 00:00

## 2022-09-07 ASSESSMENT — LOCATION DETAILED DESCRIPTION DERM
LOCATION DETAILED: RIGHT DISTAL RADIAL DORSAL FOREARM
LOCATION DETAILED: LEFT PROXIMAL PRETIBIAL REGION
LOCATION DETAILED: LEFT FOREHEAD
LOCATION DETAILED: RIGHT PROXIMAL DORSAL FOREARM
LOCATION DETAILED: RIGHT MEDIAL UPPER BACK
LOCATION DETAILED: LEFT DISTAL POSTERIOR THIGH
LOCATION DETAILED: LEFT CENTRAL MALAR CHEEK
LOCATION DETAILED: RIGHT INFERIOR LATERAL MIDBACK
LOCATION DETAILED: RIGHT DISTAL DORSAL FOREARM
LOCATION DETAILED: UPPER STERNUM
LOCATION DETAILED: RIGHT PROXIMAL CALF
LOCATION DETAILED: RIGHT CENTRAL MALAR CHEEK
LOCATION DETAILED: LEFT DISTAL DORSAL FOREARM
LOCATION DETAILED: LEFT INFERIOR MEDIAL MALAR CHEEK
LOCATION DETAILED: LEFT PROXIMAL POSTERIOR UPPER ARM
LOCATION DETAILED: RIGHT SUPERIOR MEDIAL UPPER BACK
LOCATION DETAILED: LEFT POSTERIOR SHOULDER
LOCATION DETAILED: RIGHT PROXIMAL PRETIBIAL REGION
LOCATION DETAILED: LEFT ANTERIOR DISTAL UPPER ARM
LOCATION DETAILED: PERIUMBILICAL SKIN
LOCATION DETAILED: EPIGASTRIC SKIN
LOCATION DETAILED: RIGHT DISTAL CALF
LOCATION DETAILED: SUPERIOR THORACIC SPINE
LOCATION DETAILED: LEFT SUPERIOR MEDIAL BUCCAL CHEEK
LOCATION DETAILED: LEFT MID-UPPER BACK
LOCATION DETAILED: RIGHT ANTERIOR PROXIMAL THIGH
LOCATION DETAILED: RIGHT PROXIMAL RADIAL DORSAL FOREARM
LOCATION DETAILED: LEFT ANTERIOR DISTAL THIGH
LOCATION DETAILED: LEFT DISTAL POSTERIOR UPPER ARM
LOCATION DETAILED: RIGHT DORSAL WRIST
LOCATION DETAILED: RIGHT DISTAL POSTERIOR THIGH
LOCATION DETAILED: RIGHT DISTAL PRETIBIAL REGION
LOCATION DETAILED: SUPERIOR LUMBAR SPINE
LOCATION DETAILED: MIDDLE STERNUM
LOCATION DETAILED: RIGHT POSTERIOR SHOULDER
LOCATION DETAILED: LEFT PROXIMAL DORSAL FOREARM
LOCATION DETAILED: LEFT PROXIMAL CALF
LOCATION DETAILED: RIGHT ANTERIOR DISTAL UPPER ARM
LOCATION DETAILED: MID-FRONTAL SCALP
LOCATION DETAILED: RIGHT MEDIAL DISTAL PRETIBIAL REGION
LOCATION DETAILED: RIGHT ANTERIOR DISTAL THIGH
LOCATION DETAILED: LEFT DISTAL PRETIBIAL REGION
LOCATION DETAILED: LEFT SUPERIOR LATERAL MIDBACK
LOCATION DETAILED: RIGHT PROXIMAL POSTERIOR UPPER ARM

## 2022-09-07 ASSESSMENT — LOCATION SIMPLE DESCRIPTION DERM
LOCATION SIMPLE: ANTERIOR SCALP
LOCATION SIMPLE: LEFT POSTERIOR THIGH
LOCATION SIMPLE: RIGHT POSTERIOR UPPER ARM
LOCATION SIMPLE: CHEST
LOCATION SIMPLE: LEFT FOREARM
LOCATION SIMPLE: LEFT LOWER BACK
LOCATION SIMPLE: LEFT UPPER BACK
LOCATION SIMPLE: LEFT THIGH
LOCATION SIMPLE: LEFT UPPER ARM
LOCATION SIMPLE: LEFT PRETIBIAL REGION
LOCATION SIMPLE: RIGHT FOREARM
LOCATION SIMPLE: LEFT POSTERIOR UPPER ARM
LOCATION SIMPLE: RIGHT CALF
LOCATION SIMPLE: RIGHT PRETIBIAL REGION
LOCATION SIMPLE: RIGHT UPPER BACK
LOCATION SIMPLE: RIGHT CHEEK
LOCATION SIMPLE: LOWER BACK
LOCATION SIMPLE: LEFT CALF
LOCATION SIMPLE: ABDOMEN
LOCATION SIMPLE: LEFT SHOULDER
LOCATION SIMPLE: RIGHT SHOULDER
LOCATION SIMPLE: LEFT FOREHEAD
LOCATION SIMPLE: RIGHT WRIST
LOCATION SIMPLE: RIGHT UPPER ARM
LOCATION SIMPLE: UPPER BACK
LOCATION SIMPLE: RIGHT POSTERIOR THIGH
LOCATION SIMPLE: RIGHT LOWER BACK
LOCATION SIMPLE: LEFT CHEEK
LOCATION SIMPLE: RIGHT THIGH

## 2022-09-07 ASSESSMENT — LOCATION ZONE DERM
LOCATION ZONE: FACE
LOCATION ZONE: LEG
LOCATION ZONE: ARM
LOCATION ZONE: TRUNK
LOCATION ZONE: SCALP

## 2022-09-07 NOTE — PROCEDURE: LIQUID NITROGEN
Show Applicator Variable?: Yes
Number Of Freeze-Thaw Cycles: 3 freeze-thaw cycles
Render Note In Bullet Format When Appropriate: No
Duration Of Freeze Thaw-Cycle (Seconds): 5
Consent: The patient's consent was obtained including but not limited to risks of crusting, scabbing, blistering, scarring, darker or lighter pigmentary change, recurrence, incomplete removal and infection.
Detail Level: Zone
Post-Care Instructions: I reviewed with the patient in detail post-care instructions. Patient is to wear sunprotection, and avoid picking at any of the treated lesions. Pt may apply Vaseline to crusted or scabbing areas.

## 2022-09-07 NOTE — PROCEDURE: MIPS QUALITY
Detail Level: Detailed
Quality 111:Pneumonia Vaccination Status For Older Adults: Pneumococcal vaccine administered on or after patient’s 60th birthday and before the end of the measurement period
Quality 226: Preventive Care And Screening: Tobacco Use: Screening And Cessation Intervention: Patient screened for tobacco use and is an ex/non-smoker
Quality 431: Preventive Care And Screening: Unhealthy Alcohol Use - Screening: Patient not identified as an unhealthy alcohol user when screened for unhealthy alcohol use using a systematic screening method
Quality 110: Preventive Care And Screening: Influenza Immunization: Influenza Immunization previously received during influenza season

## 2022-12-01 ENCOUNTER — APPOINTMENT (RX ONLY)
Dept: URBAN - METROPOLITAN AREA TELEMEDICINE 2 | Facility: TELEMEDICINE | Age: 77
Setting detail: DERMATOLOGY
End: 2022-12-01

## 2022-12-01 DIAGNOSIS — Z41.9 ENCOUNTER FOR PROCEDURE FOR PURPOSES OTHER THAN REMEDYING HEALTH STATE, UNSPECIFIED: ICD-10-CM

## 2022-12-01 PROCEDURE — ? COSMETIC CONSULTATION: BOTULINUM TOXIN

## 2022-12-01 PROCEDURE — ? OTHER

## 2022-12-01 PROCEDURE — ? BOTOX

## 2022-12-01 PROCEDURE — ? FILLERS

## 2022-12-01 PROCEDURE — ? COSMETIC CONSULTATION: FILLERS

## 2022-12-01 NOTE — PROCEDURE: FILLERS
Cheeks Filler Volume In Cc: 0
Map Statment: See Attach Map for Complete Details
Include Documentation That Aspiration Was Performed Prior To Injecting Filler:: Yes
Include Cannula Information In Note?: No
Nasolabial Folds Filler Volume In Cc: 0.6
Consent: Written consent obtained. Risks include but not limited to bruising, beading, irregular texture, ulceration, infection, allergic reaction, scar formation, incomplete augmentation, temporary nature, procedural pain.
Filler: Juvederm Ultra
Post-Care Instructions: Patient instructed to apply ice to reduce swelling.
Aspiration Statement: Aspiration was performed prior to injecting site with filler.
Lot #: [id number]
Additional Area 1 Location: chin
Additional Area 1 Volume In Cc: 0.4
Expiration Date (Month Year): 01/31/2025
Anesthesia Type: 1% lidocaine with epinephrine
Anesthesia Volume In Cc: 0.5
Additional Anesthesia Volume In Cc: 6
Additional Area 1 Location: mental crease
Topical Anesthesia?: BLT cream (benzocaine 20%, lidocaine 10%, tetracaine 10%)
Detail Level: Detailed
Lot #: [id number]
Price (Use Numbers Only, No Special Characters Or $): 9161
Expiration Date (Month Year): 09/05/2023
Filler: Sarah Zacarias

## 2022-12-01 NOTE — PROCEDURE: BOTOX
Show Ucl Units: No
Nasal Root Units: 0
Show Additional Area 3: Yes
Expiration Date (Month Year): 08/31/2024
Price (Use Numbers Only, No Special Characters Or $): 78
Additional Area 1 Location: Brows
Consent: Written consent obtained. Risks include but not limited to lid/brow ptosis, bruising, swelling, diplopia, temporary effect, incomplete chemical denervation.
Additional Area 2 Location: chin
Post-Care Instructions: Patient instructed to not lie down for 4 hours and limit physical activity for 24 hours. Patient instructed not to travel by airplane for 48 hours.
Detail Level: Detailed
Additional Area 2 Units: 6
Lot #: [id number]
Dilution (U/0.1 Cc): 5
Reconstitution Date (Optional): 11/22/22; 11/29/22

## 2022-12-01 NOTE — PROCEDURE: OTHER
Note Text (......Xxx Chief Complaint.): This diagnosis correlates with the
Render Risk Assessment In Note?: no
Detail Level: Detailed
Other (Free Text): Denies cold sore history

## 2023-05-04 ENCOUNTER — APPOINTMENT (RX ONLY)
Dept: URBAN - METROPOLITAN AREA TELEMEDICINE 2 | Facility: TELEMEDICINE | Age: 78
Setting detail: DERMATOLOGY
End: 2023-05-04

## 2023-05-04 DIAGNOSIS — Z41.9 ENCOUNTER FOR PROCEDURE FOR PURPOSES OTHER THAN REMEDYING HEALTH STATE, UNSPECIFIED: ICD-10-CM

## 2023-05-04 PROCEDURE — ? BOTOX

## 2023-05-04 PROCEDURE — ? OTHER

## 2023-05-04 PROCEDURE — ? COSMETIC CONSULTATION: FILLERS

## 2023-05-04 PROCEDURE — ? COSMETIC CONSULTATION: BOTULINUM TOXIN

## 2023-05-04 PROCEDURE — ? FILLERS

## 2023-05-04 NOTE — PROCEDURE: OTHER
Detail Level: Detailed
Render Risk Assessment In Note?: no
Note Text (......Xxx Chief Complaint.): This diagnosis correlates with the
Other (Free Text): Denies cold sore history

## 2023-05-04 NOTE — PROCEDURE: BOTOX
Show Ucl Units: No
Nasal Root Units: 0
Show Additional Area 3: Yes
Expiration Date (Month Year): 04/30/2023
Price (Use Numbers Only, No Special Characters Or $): 84
Additional Area 1 Location: Brows
Consent: Written consent obtained. Risks include but not limited to lid/brow ptosis, bruising, swelling, diplopia, temporary effect, incomplete chemical denervation.
Additional Area 2 Location: chin
Post-Care Instructions: Patient instructed to not lie down for 4 hours and limit physical activity for 24 hours. Patient instructed not to travel by airplane for 48 hours.
Detail Level: Detailed
Additional Area 2 Units: 6
Lot #: [id number]
Dilution (U/0.1 Cc): 5
Reconstitution Date (Optional): 05/03/2023

## 2023-05-04 NOTE — PROCEDURE: FILLERS
Additional Area 4 Volume In Cc: 0
Filler: Juvederm Ultra Plus
Include Cannula Information In Note?: No
Map Statment: See Attach Map for Complete Details
Expiration Date (Month Year): 7-
Topical Anesthesia?: BLT cream (benzocaine 20%, lidocaine 10%, tetracaine 10%)
Additional Anesthesia Volume In Cc: 6
Price (Use Numbers Only, No Special Characters Or $): 797
Lot #: [id number]
Detail Level: Detailed
Anesthesia Volume In Cc: 0.5
Anesthesia Type: 1% lidocaine with epinephrine
Expiration Date (Month Year): 07/04/2023
Post-Care Instructions: Patient instructed to apply ice to reduce swelling.
Reconstitution Date: 05/04/2023
Consent: Written consent obtained. Risks include but not limited to bruising, beading, irregular texture, ulceration, infection, allergic reaction, scar formation, incomplete augmentation, temporary nature, procedural pain.
Lot #: [id number]
Aspiration Statement: Aspiration was performed prior to injecting site with filler.
Vermilion Lips Filler Volume In Cc: 0.4
Nasolabial Folds Filler Volume In Cc: 0.6
Include Documentation That Aspiration Was Performed Prior To Injecting Filler:: Yes

## 2023-09-07 ENCOUNTER — APPOINTMENT (RX ONLY)
Dept: URBAN - METROPOLITAN AREA TELEMEDICINE 2 | Facility: TELEMEDICINE | Age: 78
Setting detail: DERMATOLOGY
End: 2023-09-07

## 2023-09-07 DIAGNOSIS — L57.0 ACTINIC KERATOSIS: ICD-10-CM

## 2023-09-07 DIAGNOSIS — Q80.8 OTHER CONGENITAL ICHTHYOSIS: ICD-10-CM

## 2023-09-07 DIAGNOSIS — L71.8 OTHER ROSACEA: ICD-10-CM

## 2023-09-07 DIAGNOSIS — D22 MELANOCYTIC NEVI: ICD-10-CM

## 2023-09-07 DIAGNOSIS — L82.0 INFLAMED SEBORRHEIC KERATOSIS: ICD-10-CM

## 2023-09-07 DIAGNOSIS — L21.8 OTHER SEBORRHEIC DERMATITIS: ICD-10-CM

## 2023-09-07 DIAGNOSIS — L81.4 OTHER MELANIN HYPERPIGMENTATION: ICD-10-CM

## 2023-09-07 DIAGNOSIS — D18.0 HEMANGIOMA: ICD-10-CM

## 2023-09-07 DIAGNOSIS — Z71.89 OTHER SPECIFIED COUNSELING: ICD-10-CM

## 2023-09-07 DIAGNOSIS — L11.1 TRANSIENT ACANTHOLYTIC DERMATOSIS [GROVER]: ICD-10-CM

## 2023-09-07 DIAGNOSIS — L82.1 OTHER SEBORRHEIC KERATOSIS: ICD-10-CM

## 2023-09-07 DIAGNOSIS — L57.8 OTHER SKIN CHANGES DUE TO CHRONIC EXPOSURE TO NONIONIZING RADIATION: ICD-10-CM

## 2023-09-07 DIAGNOSIS — L57.3 POIKILODERMA OF CIVATTE: ICD-10-CM

## 2023-09-07 PROBLEM — D18.01 HEMANGIOMA OF SKIN AND SUBCUTANEOUS TISSUE: Status: ACTIVE | Noted: 2023-09-07

## 2023-09-07 PROBLEM — D22.39 MELANOCYTIC NEVI OF OTHER PARTS OF FACE: Status: ACTIVE | Noted: 2023-09-07

## 2023-09-07 PROBLEM — D22.61 MELANOCYTIC NEVI OF RIGHT UPPER LIMB, INCLUDING SHOULDER: Status: ACTIVE | Noted: 2023-09-07

## 2023-09-07 PROBLEM — D22.72 MELANOCYTIC NEVI OF LEFT LOWER LIMB, INCLUDING HIP: Status: ACTIVE | Noted: 2023-09-07

## 2023-09-07 PROBLEM — D22.5 MELANOCYTIC NEVI OF TRUNK: Status: ACTIVE | Noted: 2023-09-07

## 2023-09-07 PROBLEM — D22.62 MELANOCYTIC NEVI OF LEFT UPPER LIMB, INCLUDING SHOULDER: Status: ACTIVE | Noted: 2023-09-07

## 2023-09-07 PROBLEM — D22.71 MELANOCYTIC NEVI OF RIGHT LOWER LIMB, INCLUDING HIP: Status: ACTIVE | Noted: 2023-09-07

## 2023-09-07 PROCEDURE — ? PRESCRIPTION MEDICATION MANAGEMENT

## 2023-09-07 PROCEDURE — ? PRESCRIPTION

## 2023-09-07 PROCEDURE — 99214 OFFICE O/P EST MOD 30 MIN: CPT | Mod: 25

## 2023-09-07 PROCEDURE — 17000 DESTRUCT PREMALG LESION: CPT | Mod: 59

## 2023-09-07 PROCEDURE — 17110 DESTRUCTION B9 LES UP TO 14: CPT

## 2023-09-07 PROCEDURE — ? COUNSELING

## 2023-09-07 PROCEDURE — 17003 DESTRUCT PREMALG LES 2-14: CPT | Mod: 59

## 2023-09-07 PROCEDURE — ? LIQUID NITROGEN

## 2023-09-07 PROCEDURE — ? PATIENT SPECIFIC COUNSELING

## 2023-09-07 RX ORDER — TRIAMCINOLONE ACETONIDE 1 MG/G
1 CREAM TOPICAL BID
Qty: 80 | Refills: 1 | Status: ERX

## 2023-09-07 RX ORDER — KETOCONAZOLE 20 MG/G
THIN LAYER CREAM TOPICAL BID
Qty: 30 | Refills: 1 | Status: ERX | COMMUNITY
Start: 2023-09-07

## 2023-09-07 RX ADMIN — KETOCONAZOLE THIN LAYER: 20 CREAM TOPICAL at 00:00

## 2023-09-07 ASSESSMENT — LOCATION DETAILED DESCRIPTION DERM
LOCATION DETAILED: RIGHT PROXIMAL RADIAL DORSAL FOREARM
LOCATION DETAILED: LEFT DISTAL POSTERIOR THIGH
LOCATION DETAILED: LEFT DISTAL DORSAL FOREARM
LOCATION DETAILED: SUPERIOR LUMBAR SPINE
LOCATION DETAILED: RIGHT PROXIMAL DORSAL FOREARM
LOCATION DETAILED: RIGHT CENTRAL MALAR CHEEK
LOCATION DETAILED: RIGHT PROXIMAL PRETIBIAL REGION
LOCATION DETAILED: RIGHT PROXIMAL POSTERIOR UPPER ARM
LOCATION DETAILED: SUPERIOR THORACIC SPINE
LOCATION DETAILED: EPIGASTRIC SKIN
LOCATION DETAILED: LEFT DISTAL POSTERIOR UPPER ARM
LOCATION DETAILED: MID TRAPEZIAL NECK
LOCATION DETAILED: LEFT SUPERIOR MEDIAL BUCCAL CHEEK
LOCATION DETAILED: RIGHT MEDIAL UPPER BACK
LOCATION DETAILED: UPPER STERNUM
LOCATION DETAILED: RIGHT DISTAL POSTERIOR THIGH
LOCATION DETAILED: LEFT INFERIOR MEDIAL MALAR CHEEK
LOCATION DETAILED: LEFT MID-UPPER BACK
LOCATION DETAILED: LEFT ANTERIOR DISTAL THIGH
LOCATION DETAILED: RIGHT DISTAL PRETIBIAL REGION
LOCATION DETAILED: RIGHT ANTERIOR PROXIMAL THIGH
LOCATION DETAILED: LEFT PROXIMAL PRETIBIAL REGION
LOCATION DETAILED: LEFT POSTERIOR ANKLE
LOCATION DETAILED: LEFT INFERIOR MEDIAL MIDBACK
LOCATION DETAILED: LEFT SUPERIOR FOREHEAD
LOCATION DETAILED: LEFT INFERIOR ANTERIOR NECK
LOCATION DETAILED: LEFT MEDIAL UPPER BACK
LOCATION DETAILED: LEFT ANTERIOR DISTAL UPPER ARM
LOCATION DETAILED: RIGHT DISTAL CALF
LOCATION DETAILED: LEFT DISTAL PRETIBIAL REGION
LOCATION DETAILED: LEFT PROXIMAL DORSAL FOREARM
LOCATION DETAILED: RIGHT MEDIAL DISTAL PRETIBIAL REGION
LOCATION DETAILED: RIGHT PROXIMAL CALF
LOCATION DETAILED: RIGHT ANTERIOR DISTAL UPPER ARM
LOCATION DETAILED: RIGHT ANTERIOR DISTAL THIGH
LOCATION DETAILED: LEFT MEDIAL FOREHEAD
LOCATION DETAILED: MID-FRONTAL SCALP
LOCATION DETAILED: RIGHT DISTAL DORSAL FOREARM
LOCATION DETAILED: RIGHT INFERIOR LATERAL MIDBACK
LOCATION DETAILED: LEFT POSTERIOR SHOULDER
LOCATION DETAILED: RIGHT SUPERIOR MEDIAL UPPER BACK
LOCATION DETAILED: LEFT CENTRAL MALAR CHEEK
LOCATION DETAILED: RIGHT NASAL ALA
LOCATION DETAILED: MIDDLE STERNUM
LOCATION DETAILED: LEFT PROXIMAL CALF
LOCATION DETAILED: RIGHT POSTERIOR SHOULDER
LOCATION DETAILED: PERIUMBILICAL SKIN
LOCATION DETAILED: LEFT SUPERIOR LATERAL MIDBACK
LOCATION DETAILED: LEFT PROXIMAL POSTERIOR UPPER ARM

## 2023-09-07 ASSESSMENT — LOCATION SIMPLE DESCRIPTION DERM
LOCATION SIMPLE: RIGHT POSTERIOR UPPER ARM
LOCATION SIMPLE: LEFT SHOULDER
LOCATION SIMPLE: RIGHT PRETIBIAL REGION
LOCATION SIMPLE: LEFT POSTERIOR UPPER ARM
LOCATION SIMPLE: UPPER BACK
LOCATION SIMPLE: LEFT THIGH
LOCATION SIMPLE: RIGHT POSTERIOR THIGH
LOCATION SIMPLE: RIGHT CHEEK
LOCATION SIMPLE: LEFT UPPER BACK
LOCATION SIMPLE: LEFT ANKLE
LOCATION SIMPLE: RIGHT CALF
LOCATION SIMPLE: LEFT FOREHEAD
LOCATION SIMPLE: LEFT CALF
LOCATION SIMPLE: LEFT CHEEK
LOCATION SIMPLE: RIGHT FOREARM
LOCATION SIMPLE: LEFT UPPER ARM
LOCATION SIMPLE: RIGHT LOWER BACK
LOCATION SIMPLE: RIGHT UPPER ARM
LOCATION SIMPLE: LEFT POSTERIOR THIGH
LOCATION SIMPLE: RIGHT UPPER BACK
LOCATION SIMPLE: RIGHT SHOULDER
LOCATION SIMPLE: LEFT PRETIBIAL REGION
LOCATION SIMPLE: ABDOMEN
LOCATION SIMPLE: LEFT LOWER BACK
LOCATION SIMPLE: LEFT FOREARM
LOCATION SIMPLE: LEFT ANTERIOR NECK
LOCATION SIMPLE: RIGHT NOSE
LOCATION SIMPLE: ANTERIOR SCALP
LOCATION SIMPLE: TRAPEZIAL NECK
LOCATION SIMPLE: LOWER BACK
LOCATION SIMPLE: RIGHT THIGH
LOCATION SIMPLE: CHEST

## 2023-09-07 ASSESSMENT — LOCATION ZONE DERM
LOCATION ZONE: NOSE
LOCATION ZONE: SCALP
LOCATION ZONE: FACE
LOCATION ZONE: LEG
LOCATION ZONE: ARM
LOCATION ZONE: TRUNK
LOCATION ZONE: NECK

## 2023-09-07 NOTE — PROCEDURE: PRESCRIPTION MEDICATION MANAGEMENT
Initiate Treatment: ketoconazole 2 % topical cream BID\\nApply thin layer to affected red/scaly areas of face twice daily prn
Detail Level: Zone
Render In Strict Bullet Format?: No
Continue Regimen: - tac cream
Continue Regimen: - metronidazole 0.75 % topical cream BID PRN\\nApply thin layer to face rosacea once to twice daily

## 2023-09-07 NOTE — PROCEDURE: LIQUID NITROGEN
Post-Care Instructions: I reviewed with the patient in detail post-care instructions. Patient is to wear sunprotection, and avoid picking at any of the treated lesions. Pt may apply Vaseline to crusted or scabbing areas.
Detail Level: Simple
Show Spray Paint Technique Variable?: Yes
Medical Necessity Information: It is in your best interest to select a reason for this procedure from the list below. All of these items fulfill various CMS LCD requirements except the new and changing color options.
Spray Paint Text: The liquid nitrogen was applied to the skin utilizing a spray paint frosting technique.
Add 52 Modifier (Optional): no
Consent: The patient's consent was obtained including but not limited to risks of crusting, scabbing, blistering, scarring, darker or lighter pigmentary change, recurrence, incomplete removal and infection.
Medical Necessity Clause: This procedure was medically necessary because the lesions that were treated were:
Number Of Freeze-Thaw Cycles: 3 freeze-thaw cycles
Detail Level: Zone
Duration Of Freeze Thaw-Cycle (Seconds): 5

## 2023-10-05 ENCOUNTER — APPOINTMENT (RX ONLY)
Dept: URBAN - METROPOLITAN AREA TELEMEDICINE 2 | Facility: TELEMEDICINE | Age: 78
Setting detail: DERMATOLOGY
End: 2023-10-05

## 2023-10-05 DIAGNOSIS — Z41.9 ENCOUNTER FOR PROCEDURE FOR PURPOSES OTHER THAN REMEDYING HEALTH STATE, UNSPECIFIED: ICD-10-CM

## 2023-10-05 PROCEDURE — ? BOTOX

## 2023-10-05 PROCEDURE — ? OTHER

## 2023-10-05 PROCEDURE — ? FILLERS

## 2023-10-05 PROCEDURE — ? COSMETIC CONSULTATION: BOTULINUM TOXIN

## 2023-10-05 PROCEDURE — ? COSMETIC CONSULTATION: FILLERS

## 2023-10-05 NOTE — PROCEDURE: FILLERS
Decollete Filler Volume In Cc: 0
Detail Level: Detailed
Filler: Juvederm Ultra Plus XC
Price (Use Numbers Only, No Special Characters Or $): 906
Use Map Statement For Sites (Optional): No
Nasolabial Folds Filler Volume In Cc: 0.3
Vermilion Lips Filler Volume In Cc: 0.2
Consent: Written consent obtained. Risks include but not limited to bruising, beading, irregular texture, ulceration, infection, allergic reaction, scar formation, incomplete augmentation, temporary nature, procedural pain.
Map Statment: See Attach Map for Complete Details
Lot #: [id number]
Expiration Date (Month Year): 3/29/24
Include Documentation That Aspiration Was Performed Prior To Injecting Filler:: Yes
Post-Care Instructions: Patient instructed to apply ice to reduce swelling.
Lot #: [id number]
Aspiration Statement: Aspiration was performed prior to injecting site with filler.
Anesthesia Type: 1% lidocaine with epinephrine
Expiration Date (Month Year): 7-
Topical Anesthesia?: BLT gel (benzocaine 20%, lidocaine 10%, tetracaine 10%)
Additional Area 1 Location: chin
Additional Anesthesia Volume In Cc: 6
Anesthesia Volume In Cc: 0.5

## 2023-10-05 NOTE — PROCEDURE: OTHER
Note Text (......Xxx Chief Complaint.): This diagnosis correlates with the
Detail Level: Detailed
Render Risk Assessment In Note?: no
Other (Free Text): Denies cold sore history

## 2023-10-05 NOTE — PROCEDURE: BOTOX
Show Ucl Units: No
Nasal Root Units: 0
Show Additional Area 3: Yes
Expiration Date (Month Year): 10/31/2025
Price (Use Numbers Only, No Special Characters Or $): 979
Glabellar Complex Units: 20
Additional Area 1 Location: Brows
Consent: Written consent obtained. Risks include but not limited to lid/brow ptosis, bruising, swelling, diplopia, temporary effect, incomplete chemical denervation.
Additional Area 2 Location: chin
Post-Care Instructions: Patient instructed to not lie down for 4 hours and limit physical activity for 24 hours. Patient instructed not to travel by airplane for 48 hours.
Detail Level: Detailed
Additional Area 2 Units: 6
Lot #: [id number]
Dilution (U/0.1 Cc): 5
Reconstitution Date (Optional): 10/4/23; 10/5/23

## 2024-02-29 ENCOUNTER — APPOINTMENT (RX ONLY)
Dept: URBAN - METROPOLITAN AREA TELEMEDICINE 2 | Facility: TELEMEDICINE | Age: 79
Setting detail: DERMATOLOGY
End: 2024-02-29

## 2024-02-29 DIAGNOSIS — Z41.9 ENCOUNTER FOR PROCEDURE FOR PURPOSES OTHER THAN REMEDYING HEALTH STATE, UNSPECIFIED: ICD-10-CM

## 2024-02-29 PROCEDURE — ? BOTOX

## 2024-02-29 PROCEDURE — ? FILLERS

## 2024-02-29 PROCEDURE — ? OTHER

## 2024-02-29 PROCEDURE — ? COSMETIC CONSULTATION: FILLERS

## 2024-02-29 PROCEDURE — ? COSMETIC CONSULTATION: BOTULINUM TOXIN

## 2024-02-29 NOTE — PROCEDURE: BOTOX
Show Ucl Units: No
Nasal Root Units: 0
Show Additional Area 3: Yes
Expiration Date (Month Year): 5/31/2025
Price (Use Numbers Only, No Special Characters Or $): 267
Glabellar Complex Units: 20
Additional Area 1 Location: Brows
Consent: Written consent obtained. Risks include but not limited to lid/brow ptosis, bruising, swelling, diplopia, temporary effect, incomplete chemical denervation.
Additional Area 2 Location: chin
Post-Care Instructions: Patient instructed to not lie down for 4 hours and limit physical activity for 24 hours. Patient instructed not to travel by airplane for 48 hours.
Detail Level: Detailed
Additional Area 2 Units: 6
Lot #: [id number]
Dilution (U/0.1 Cc): 5
Reconstitution Date (Optional): 2/28/2024

## 2024-02-29 NOTE — PROCEDURE: FILLERS
Decollete Filler Volume In Cc: 0
Detail Level: Detailed
Filler: Juvederm Ultra Plus XC
Price (Use Numbers Only, No Special Characters Or $): 9307
Filler: Skinvive
Use Map Statement For Sites (Optional): No
Nasolabial Folds Filler Volume In Cc: 0.6
Vermilion Lips Filler Volume In Cc: 0.4
Consent: Written consent obtained. Risks include but not limited to bruising, beading, irregular texture, ulceration, infection, allergic reaction, scar formation, incomplete augmentation, temporary nature, procedural pain.
Map Statment: See Attach Map for Complete Details
Lot #: [id number]
Cheeks Filler Volume In Cc: 2
Expiration Date (Month Year): 6/27/2024
Include Documentation That Aspiration Was Performed Prior To Injecting Filler:: Yes
Post-Care Instructions: Patient instructed to apply ice to reduce swelling.
Lot #: [id number]
Aspiration Statement: Aspiration was performed prior to injecting site with filler.
Expiration Date (Month Year): 5/20/2024
Topical Anesthesia?: BLT gel (benzocaine 20%, lidocaine 10%, tetracaine 10%)

## 2024-07-29 ENCOUNTER — APPOINTMENT (RX ONLY)
Dept: URBAN - METROPOLITAN AREA TELEMEDICINE 2 | Facility: TELEMEDICINE | Age: 79
Setting detail: DERMATOLOGY
End: 2024-07-29

## 2024-07-29 DIAGNOSIS — Z41.9 ENCOUNTER FOR PROCEDURE FOR PURPOSES OTHER THAN REMEDYING HEALTH STATE, UNSPECIFIED: ICD-10-CM

## 2024-07-29 PROCEDURE — ? BOTOX

## 2024-07-29 PROCEDURE — ? COSMETIC CONSULTATION: BOTULINUM TOXIN

## 2024-07-29 PROCEDURE — ? OTHER

## 2024-07-29 PROCEDURE — ? FILLERS

## 2024-07-29 PROCEDURE — ? COSMETIC CONSULTATION: FILLERS

## 2024-07-29 NOTE — PROCEDURE: FILLERS
Additional Area 5 Volume In Cc: 0
Filler: Skinvive
Lot #: [id number]
Aspiration Statement: Aspiration was performed prior to injecting site with filler.
Include Cannula Information In Note?: No
Expiration Date (Month Year): 10/08/2025
Anesthesia Type: 1% lidocaine with epinephrine
Expiration Date (Month Year): 10/08/25
Anesthesia Volume In Cc: 0.5
Additional Anesthesia Volume In Cc: 6
Topical Anesthesia?: BLT cream (benzocaine 20%, lidocaine 10%, tetracaine 10%)
Detail Level: Detailed
Price (Use Numbers Only, No Special Characters Or $): 839
Consent: Written consent obtained. Risks include but not limited to bruising, beading, irregular texture, ulceration, infection, allergic reaction, scar formation, incomplete augmentation, temporary nature, procedural pain.
Cheeks Filler Volume In Cc: 2
Post-Care Instructions: Patient instructed to apply ice to reduce swelling.
Include Documentation That Aspiration Was Performed Prior To Injecting Filler:: Yes
Map Statment: See Attach Map for Complete Details

## 2024-07-29 NOTE — PROCEDURE: BOTOX
Show Ucl Units: No
Nasal Root Units: 0
Show Additional Area 3: Yes
Expiration Date (Month Year): 3/31/26
Price (Use Numbers Only, No Special Characters Or $): 513
Glabellar Complex Units: 20
Additional Area 1 Location: Brows
Consent: Written consent obtained. Risks include but not limited to lid/brow ptosis, bruising, swelling, diplopia, temporary effect, incomplete chemical denervation.
Additional Area 2 Location: chin
Post-Care Instructions: Patient instructed to not lie down for 4 hours and limit physical activity for 24 hours. Patient instructed not to travel by airplane for 48 hours.
Detail Level: Detailed
Additional Area 2 Units: 6
Lot #: [id number]
Dilution (U/0.1 Cc): 5
Reconstitution Date (Optional): 7/22/24

## 2024-09-11 ENCOUNTER — APPOINTMENT (RX ONLY)
Dept: URBAN - METROPOLITAN AREA TELEMEDICINE 2 | Facility: TELEMEDICINE | Age: 79
Setting detail: DERMATOLOGY
End: 2024-09-11

## 2024-09-11 DIAGNOSIS — D22 MELANOCYTIC NEVI: ICD-10-CM | Status: STABLE

## 2024-09-11 DIAGNOSIS — L57.0 ACTINIC KERATOSIS: ICD-10-CM | Status: INADEQUATELY CONTROLLED

## 2024-09-11 DIAGNOSIS — Z71.89 OTHER SPECIFIED COUNSELING: ICD-10-CM

## 2024-09-11 DIAGNOSIS — D18.0 HEMANGIOMA: ICD-10-CM | Status: STABLE

## 2024-09-11 DIAGNOSIS — L11.1 TRANSIENT ACANTHOLYTIC DERMATOSIS [GROVER]: ICD-10-CM | Status: WELL CONTROLLED

## 2024-09-11 DIAGNOSIS — L82.1 OTHER SEBORRHEIC KERATOSIS: ICD-10-CM | Status: STABLE

## 2024-09-11 DIAGNOSIS — L81.4 OTHER MELANIN HYPERPIGMENTATION: ICD-10-CM | Status: STABLE

## 2024-09-11 DIAGNOSIS — L21.8 OTHER SEBORRHEIC DERMATITIS: ICD-10-CM | Status: WELL CONTROLLED

## 2024-09-11 DIAGNOSIS — L57.3 POIKILODERMA OF CIVATTE: ICD-10-CM | Status: STABLE

## 2024-09-11 DIAGNOSIS — Q80.8 OTHER CONGENITAL ICHTHYOSIS: ICD-10-CM | Status: STABLE

## 2024-09-11 DIAGNOSIS — L71.8 OTHER ROSACEA: ICD-10-CM

## 2024-09-11 DIAGNOSIS — L57.8 OTHER SKIN CHANGES DUE TO CHRONIC EXPOSURE TO NONIONIZING RADIATION: ICD-10-CM | Status: STABLE

## 2024-09-11 PROBLEM — D22.62 MELANOCYTIC NEVI OF LEFT UPPER LIMB, INCLUDING SHOULDER: Status: ACTIVE | Noted: 2024-09-11

## 2024-09-11 PROBLEM — D18.01 HEMANGIOMA OF SKIN AND SUBCUTANEOUS TISSUE: Status: ACTIVE | Noted: 2024-09-11

## 2024-09-11 PROBLEM — D22.71 MELANOCYTIC NEVI OF RIGHT LOWER LIMB, INCLUDING HIP: Status: ACTIVE | Noted: 2024-09-11

## 2024-09-11 PROBLEM — D22.61 MELANOCYTIC NEVI OF RIGHT UPPER LIMB, INCLUDING SHOULDER: Status: ACTIVE | Noted: 2024-09-11

## 2024-09-11 PROBLEM — D22.5 MELANOCYTIC NEVI OF TRUNK: Status: ACTIVE | Noted: 2024-09-11

## 2024-09-11 PROBLEM — D22.39 MELANOCYTIC NEVI OF OTHER PARTS OF FACE: Status: ACTIVE | Noted: 2024-09-11

## 2024-09-11 PROBLEM — D22.72 MELANOCYTIC NEVI OF LEFT LOWER LIMB, INCLUDING HIP: Status: ACTIVE | Noted: 2024-09-11

## 2024-09-11 PROCEDURE — 17003 DESTRUCT PREMALG LES 2-14: CPT

## 2024-09-11 PROCEDURE — ? PRESCRIPTION

## 2024-09-11 PROCEDURE — 99214 OFFICE O/P EST MOD 30 MIN: CPT | Mod: 25

## 2024-09-11 PROCEDURE — ? PRESCRIPTION MEDICATION MANAGEMENT

## 2024-09-11 PROCEDURE — ? LIQUID NITROGEN

## 2024-09-11 PROCEDURE — ? SUNSCREEN RECOMMENDATIONS

## 2024-09-11 PROCEDURE — ? COUNSELING

## 2024-09-11 PROCEDURE — 17000 DESTRUCT PREMALG LESION: CPT

## 2024-09-11 RX ORDER — KETOCONAZOLE 20 MG/G
THIN LAYER CREAM TOPICAL BID
Qty: 30 | Refills: 1 | Status: ERX

## 2024-09-11 RX ORDER — TRIAMCINOLONE ACETONIDE 1 MG/G
1 CREAM TOPICAL BID
Qty: 80 | Refills: 1 | Status: ERX

## 2024-09-11 ASSESSMENT — LOCATION DETAILED DESCRIPTION DERM
LOCATION DETAILED: LEFT ANTERIOR DISTAL THIGH
LOCATION DETAILED: UPPER STERNUM
LOCATION DETAILED: LEFT DISTAL DORSAL FOREARM
LOCATION DETAILED: LEFT MID-UPPER BACK
LOCATION DETAILED: MID-FRONTAL SCALP
LOCATION DETAILED: LEFT POSTERIOR SHOULDER
LOCATION DETAILED: NASAL DORSUM
LOCATION DETAILED: RIGHT DISTAL PRETIBIAL REGION
LOCATION DETAILED: LEFT INFERIOR MEDIAL MALAR CHEEK
LOCATION DETAILED: LEFT POSTERIOR ANKLE
LOCATION DETAILED: RIGHT MEDIAL FOREHEAD
LOCATION DETAILED: EPIGASTRIC SKIN
LOCATION DETAILED: RIGHT PROXIMAL POSTERIOR UPPER ARM
LOCATION DETAILED: LEFT ANTERIOR DISTAL UPPER ARM
LOCATION DETAILED: MID TRAPEZIAL NECK
LOCATION DETAILED: RIGHT DISTAL POSTERIOR THIGH
LOCATION DETAILED: LEFT DISTAL POSTERIOR UPPER ARM
LOCATION DETAILED: SUPERIOR LUMBAR SPINE
LOCATION DETAILED: RIGHT PROXIMAL DORSAL FOREARM
LOCATION DETAILED: LEFT INFERIOR ANTERIOR NECK
LOCATION DETAILED: RIGHT SUPERIOR MEDIAL UPPER BACK
LOCATION DETAILED: RIGHT MEDIAL UPPER BACK
LOCATION DETAILED: LEFT PROXIMAL POSTERIOR UPPER ARM
LOCATION DETAILED: LEFT PROXIMAL CALF
LOCATION DETAILED: PERIUMBILICAL SKIN
LOCATION DETAILED: RIGHT INFERIOR LATERAL MIDBACK
LOCATION DETAILED: RIGHT ANTERIOR DISTAL THIGH
LOCATION DETAILED: RIGHT ANTERIOR PROXIMAL THIGH
LOCATION DETAILED: LEFT DISTAL PRETIBIAL REGION
LOCATION DETAILED: RIGHT PROXIMAL RADIAL DORSAL FOREARM
LOCATION DETAILED: RIGHT MEDIAL DISTAL PRETIBIAL REGION
LOCATION DETAILED: LEFT PROXIMAL PRETIBIAL REGION
LOCATION DETAILED: RIGHT ANTERIOR DISTAL UPPER ARM
LOCATION DETAILED: MIDDLE STERNUM
LOCATION DETAILED: RIGHT DISTAL DORSAL FOREARM
LOCATION DETAILED: RIGHT PROXIMAL PRETIBIAL REGION
LOCATION DETAILED: LEFT PROXIMAL DORSAL FOREARM
LOCATION DETAILED: RIGHT NASAL ALA
LOCATION DETAILED: LEFT ANTERIOR PROXIMAL THIGH
LOCATION DETAILED: LEFT CENTRAL MALAR CHEEK
LOCATION DETAILED: RIGHT CENTRAL MALAR CHEEK
LOCATION DETAILED: LEFT DISTAL POSTERIOR THIGH
LOCATION DETAILED: LEFT SUPERIOR MEDIAL BUCCAL CHEEK
LOCATION DETAILED: RIGHT DISTAL CALF
LOCATION DETAILED: RIGHT POSTERIOR SHOULDER
LOCATION DETAILED: RIGHT PROXIMAL CALF
LOCATION DETAILED: LEFT SUPERIOR LATERAL MIDBACK
LOCATION DETAILED: SUPERIOR THORACIC SPINE

## 2024-09-11 ASSESSMENT — LOCATION SIMPLE DESCRIPTION DERM
LOCATION SIMPLE: RIGHT PRETIBIAL REGION
LOCATION SIMPLE: LEFT POSTERIOR UPPER ARM
LOCATION SIMPLE: RIGHT THIGH
LOCATION SIMPLE: LEFT ANTERIOR NECK
LOCATION SIMPLE: RIGHT CALF
LOCATION SIMPLE: RIGHT SHOULDER
LOCATION SIMPLE: RIGHT LOWER BACK
LOCATION SIMPLE: RIGHT FOREHEAD
LOCATION SIMPLE: LEFT THIGH
LOCATION SIMPLE: LOWER BACK
LOCATION SIMPLE: LEFT CHEEK
LOCATION SIMPLE: CHEST
LOCATION SIMPLE: LEFT ANKLE
LOCATION SIMPLE: LEFT PRETIBIAL REGION
LOCATION SIMPLE: RIGHT POSTERIOR UPPER ARM
LOCATION SIMPLE: UPPER BACK
LOCATION SIMPLE: RIGHT NOSE
LOCATION SIMPLE: TRAPEZIAL NECK
LOCATION SIMPLE: LEFT UPPER BACK
LOCATION SIMPLE: RIGHT FOREARM
LOCATION SIMPLE: ABDOMEN
LOCATION SIMPLE: LEFT SHOULDER
LOCATION SIMPLE: RIGHT POSTERIOR THIGH
LOCATION SIMPLE: RIGHT UPPER ARM
LOCATION SIMPLE: LEFT UPPER ARM
LOCATION SIMPLE: LEFT FOREARM
LOCATION SIMPLE: ANTERIOR SCALP
LOCATION SIMPLE: LEFT CALF
LOCATION SIMPLE: LEFT POSTERIOR THIGH
LOCATION SIMPLE: LEFT LOWER BACK
LOCATION SIMPLE: NOSE
LOCATION SIMPLE: RIGHT CHEEK
LOCATION SIMPLE: RIGHT UPPER BACK

## 2024-09-11 ASSESSMENT — LOCATION ZONE DERM
LOCATION ZONE: SCALP
LOCATION ZONE: LEG
LOCATION ZONE: NECK
LOCATION ZONE: ARM
LOCATION ZONE: FACE
LOCATION ZONE: NOSE
LOCATION ZONE: TRUNK

## 2024-09-11 NOTE — PROCEDURE: PRESCRIPTION MEDICATION MANAGEMENT
Continue Regimen: ketoconazole 2 % topical cream BID\\nApply thin layer to affected red/scaly areas of face twice daily prn
Detail Level: Zone
Render In Strict Bullet Format?: No
Continue Regimen: - tac cream PRN
Continue Regimen: - metronidazole 0.75 % topical cream BID PRN\\nApply thin layer to face rosacea once to twice daily

## 2024-09-11 NOTE — PROCEDURE: LIQUID NITROGEN
Show Applicator Variable?: Yes
Duration Of Freeze Thaw-Cycle (Seconds): 5
Render Note In Bullet Format When Appropriate: No
Number Of Freeze-Thaw Cycles: 3 freeze-thaw cycles
Post-Care Instructions: I reviewed with the patient in detail post-care instructions. Patient is to wear sunprotection, and avoid picking at any of the treated lesions. Pt may apply Vaseline to crusted or scabbing areas.
Consent: The patient's consent was obtained including but not limited to risks of crusting, scabbing, blistering, scarring, darker or lighter pigmentary change, recurrence, incomplete removal and infection.
Detail Level: Zone

## 2024-11-20 ENCOUNTER — APPOINTMENT (RX ONLY)
Dept: URBAN - METROPOLITAN AREA TELEMEDICINE 2 | Facility: TELEMEDICINE | Age: 79
Setting detail: DERMATOLOGY
End: 2024-11-20

## 2024-11-20 DIAGNOSIS — Z41.9 ENCOUNTER FOR PROCEDURE FOR PURPOSES OTHER THAN REMEDYING HEALTH STATE, UNSPECIFIED: ICD-10-CM

## 2024-11-20 DIAGNOSIS — B00.1 HERPESVIRAL VESICULAR DERMATITIS: ICD-10-CM

## 2024-11-20 PROCEDURE — ? COSMETIC CONSULTATION: BOTULINUM TOXIN

## 2024-11-20 PROCEDURE — ? FILLERS

## 2024-11-20 PROCEDURE — ? BOTOX

## 2024-11-20 PROCEDURE — ? PRESCRIPTION MEDICATION MANAGEMENT

## 2024-11-20 PROCEDURE — ? COUNSELING

## 2024-11-20 PROCEDURE — ? OTHER

## 2024-11-20 PROCEDURE — ? COSMETIC CONSULTATION: FILLERS

## 2024-11-20 NOTE — PROCEDURE: FILLERS
Nasolabial Folds Filler Volume In Cc: 0
Additional Area 2 Volume In Cc: 1.3
Aspiration Statement: Aspiration was performed prior to injecting site with filler.
Include Cannula Information In Note?: No
Filler: Restylane Contour
Vermilion Lips Filler Volume In Cc: 0.5
Include Cannula Size?: 22G
Lot #: [id number]
Cheeks Filler Volume In Cc: 0.2
Consent: Written consent obtained. Risks include but not limited to bruising, beading, irregular texture, ulceration, infection, allergic reaction, scar formation, incomplete augmentation, temporary nature, procedural pain.
Post-Care Instructions: Patient instructed to apply ice to reduce swelling.
Anesthesia Type: 1% lidocaine with epinephrine
Expiration Date (Month Year): 8/17/25
Include Cannula Length?: 2 inch
Lot #: [id number]
Additional Anesthesia Volume In Cc: 6
Expiration Date (Month Year): 3/31/2026
Topical Anesthesia?: BLT cream (benzocaine 20%, lidocaine 10%, tetracaine 10%)
Include Cannula Information In Note?: Yes
Detail Level: Detailed
Price (Use Numbers Only, No Special Characters Or $): 7616
Filler: Juvederm Volbella XC
Additional Area 1 Location: pyriform
Map Statment: See Attach Map for Complete Details
Additional Area 1 Volume In Cc: 0.3
Additional Area 2 Location: [facility]

## 2024-11-20 NOTE — PROCEDURE: PRESCRIPTION MEDICATION MANAGEMENT
Plan: First 2 pills given at office
Initiate Treatment: Valtrex (pt has at home)
Detail Level: Zone
Render In Strict Bullet Format?: No

## 2024-11-20 NOTE — PROCEDURE: BOTOX
Show Ucl Units: No
Nasal Root Units: 0
Show Additional Area 3: Yes
Expiration Date (Month Year): 11/30/26
Price (Use Numbers Only, No Special Characters Or $): 362
Glabellar Complex Units: 20
Additional Area 1 Location: Brows
Consent: Written consent obtained. Risks include but not limited to lid/brow ptosis, bruising, swelling, diplopia, temporary effect, incomplete chemical denervation.
Additional Area 2 Location: chin
Post-Care Instructions: Patient instructed to not lie down for 4 hours and limit physical activity for 24 hours. Patient instructed not to travel by airplane for 48 hours.
Detail Level: Detailed
Additional Area 2 Units: 6
Lot #: [id number]
Dilution (U/0.1 Cc): 5
Reconstitution Date (Optional): 11/18/24

## 2025-03-17 ENCOUNTER — APPOINTMENT (OUTPATIENT)
Dept: URBAN - METROPOLITAN AREA TELEMEDICINE 2 | Facility: TELEMEDICINE | Age: 80
Setting detail: DERMATOLOGY
End: 2025-03-17

## 2025-03-17 DIAGNOSIS — B00.1 HERPESVIRAL VESICULAR DERMATITIS: ICD-10-CM

## 2025-03-17 DIAGNOSIS — Z41.9 ENCOUNTER FOR PROCEDURE FOR PURPOSES OTHER THAN REMEDYING HEALTH STATE, UNSPECIFIED: ICD-10-CM

## 2025-03-17 PROCEDURE — ? BOTOX

## 2025-03-17 PROCEDURE — ? COSMETIC CONSULTATION: BOTULINUM TOXIN

## 2025-03-17 PROCEDURE — ? COUNSELING

## 2025-03-17 PROCEDURE — ? FILLERS

## 2025-03-17 PROCEDURE — ? OTHER

## 2025-03-17 PROCEDURE — ? COSMETIC CONSULTATION: FILLERS

## 2025-03-17 PROCEDURE — ? PRESCRIPTION

## 2025-03-17 PROCEDURE — ? PRESCRIPTION MEDICATION MANAGEMENT

## 2025-03-17 RX ORDER — VALACYCLOVIR 500 MG/1
1 TABLET, FILM COATED ORAL BID
Qty: 18 | Refills: 1 | Status: ERX | COMMUNITY
Start: 2025-03-17

## 2025-03-17 RX ADMIN — VALACYCLOVIR 1: 500 TABLET, FILM COATED ORAL at 00:00

## 2025-03-17 ASSESSMENT — LOCATION ZONE DERM: LOCATION ZONE: LIP

## 2025-03-17 ASSESSMENT — LOCATION DETAILED DESCRIPTION DERM: LOCATION DETAILED: LEFT INFERIOR VERMILION LIP

## 2025-03-17 ASSESSMENT — LOCATION SIMPLE DESCRIPTION DERM: LOCATION SIMPLE: LEFT LIP

## 2025-03-17 NOTE — PROCEDURE: FILLERS
Brows Filler Volume In Cc: 0
Additional Anesthesia Volume In Cc: 6
Include Cannula Information In Note?: No
Topical Anesthesia?: BLT cream (benzocaine 20%, lidocaine 10%, tetracaine 10%)
Detail Level: Detailed
Price (Use Numbers Only, No Special Characters Or $): 5601
Filler: Sarah Zacarias
Filler: Skinvive
Lot #: [id number]
Map Statment: See Attach Map for Complete Details
Cheeks Filler Volume In Cc: 0.2
Include Documentation That Aspiration Was Performed Prior To Injecting Filler:: Yes
Jawline Filler Volume In Cc: 0.6
Cheeks Filler Volume In Cc: 2
Expiration Date (Month Year): 2/28/27; 01/31/27
Nasolabial Folds Filler Volume In Cc: 0.3
Vermilion Lips Filler Volume In Cc: 0.1
Aspiration Statement: Aspiration was performed prior to injecting site with filler.
Consent: Written consent obtained. Risks include but not limited to bruising, beading, irregular texture, ulceration, infection, allergic reaction, scar formation, incomplete augmentation, temporary nature, procedural pain.
Lot #: [id number]
Post-Care Instructions: Patient instructed to apply ice to reduce swelling.
Anesthesia Type: 1% lidocaine with epinephrine
Expiration Date (Month Year): 11-
Additional Area 1 Location: chin
Anesthesia Volume In Cc: 0.5

## 2025-03-17 NOTE — PROCEDURE: OTHER
Detail Level: Detailed
Render Risk Assessment In Note?: no
Note Text (......Xxx Chief Complaint.): This diagnosis correlates with the
Other (Free Text): First dose given at office today

## 2025-03-17 NOTE — PROCEDURE: BOTOX
Inferior Lateral Orbicularis Oculi Units: 0
Additional Area 2 Location: Lower lids
Show Levator Superior Units: Yes
Price (Use Numbers Only, No Special Characters Or $): 934
Show Ucl Units: No
Consent: Written consent obtained. Risks include but not limited to lid/brow ptosis, bruising, swelling, diplopia, temporary effect, incomplete chemical denervation.
Additional Area 1 Location: Brows
Lot #: [id number]
Post-Care Instructions: Patient instructed to not lie down for 4 hours and limit physical activity for 24 hours. Patient instructed not to travel by airplane for 48 hours.
Dilution (U/0.1 Cc): 5
Reconstitution Date (Optional): 3/14/25
Incrementing Botox Units: By 0.5 Units
Additional Area 3 Location: chin
Detail Level: Detailed
Additional Area 3 Units: 6
Glabellar Complex Units: 20
Expiration Date (Month Year): 2/28/27

## 2025-07-11 ENCOUNTER — APPOINTMENT (OUTPATIENT)
Dept: URBAN - METROPOLITAN AREA TELEMEDICINE 2 | Facility: TELEMEDICINE | Age: 80
Setting detail: DERMATOLOGY
End: 2025-07-11

## 2025-07-11 DIAGNOSIS — B00.1 HERPESVIRAL VESICULAR DERMATITIS: ICD-10-CM

## 2025-07-11 DIAGNOSIS — Z41.9 ENCOUNTER FOR PROCEDURE FOR PURPOSES OTHER THAN REMEDYING HEALTH STATE, UNSPECIFIED: ICD-10-CM

## 2025-07-11 PROCEDURE — ? PRESCRIPTION

## 2025-07-11 PROCEDURE — ? FILLERS

## 2025-07-11 PROCEDURE — ? COSMETIC CONSULTATION: FILLERS

## 2025-07-11 PROCEDURE — ? COUNSELING

## 2025-07-11 PROCEDURE — ? COSMETIC CONSULTATION: BOTULINUM TOXIN

## 2025-07-11 PROCEDURE — ? OTHER

## 2025-07-11 PROCEDURE — ? PRESCRIPTION MEDICATION MANAGEMENT

## 2025-07-11 PROCEDURE — ? BOTOX

## 2025-07-11 RX ORDER — VALACYCLOVIR 500 MG/1
1 TABLET, FILM COATED ORAL BID
Qty: 18 | Refills: 1 | Status: ERX

## 2025-07-11 ASSESSMENT — LOCATION ZONE DERM: LOCATION ZONE: LIP

## 2025-07-11 ASSESSMENT — LOCATION DETAILED DESCRIPTION DERM: LOCATION DETAILED: LEFT INFERIOR VERMILION LIP

## 2025-07-11 ASSESSMENT — LOCATION SIMPLE DESCRIPTION DERM: LOCATION SIMPLE: LEFT LIP
